# Patient Record
Sex: FEMALE | Race: BLACK OR AFRICAN AMERICAN | Employment: FULL TIME | URBAN - METROPOLITAN AREA
[De-identification: names, ages, dates, MRNs, and addresses within clinical notes are randomized per-mention and may not be internally consistent; named-entity substitution may affect disease eponyms.]

---

## 2018-05-10 ENCOUNTER — HOSPITAL ENCOUNTER (EMERGENCY)
Facility: HOSPITAL | Age: 58
Discharge: HOME | End: 2018-05-10
Payer: COMMERCIAL

## 2018-05-10 ENCOUNTER — APPOINTMENT (OUTPATIENT)
Dept: GENERAL RADIOLOGY | Facility: HOSPITAL | Age: 58
End: 2018-05-10
Attending: NURSE PRACTITIONER
Payer: COMMERCIAL

## 2018-05-10 VITALS
HEART RATE: 85 BPM | WEIGHT: 143 LBS | BODY MASS INDEX: 24.41 KG/M2 | SYSTOLIC BLOOD PRESSURE: 116 MMHG | OXYGEN SATURATION: 100 % | DIASTOLIC BLOOD PRESSURE: 66 MMHG | HEIGHT: 64 IN | TEMPERATURE: 98 F | RESPIRATION RATE: 18 BRPM

## 2018-05-10 DIAGNOSIS — S16.1XXA STRAIN OF NECK MUSCLE, INITIAL ENCOUNTER: Primary | ICD-10-CM

## 2018-05-10 PROCEDURE — 72040 X-RAY EXAM NECK SPINE 2-3 VW: CPT | Performed by: NURSE PRACTITIONER

## 2018-05-10 PROCEDURE — 99283 EMERGENCY DEPT VISIT LOW MDM: CPT

## 2018-05-10 RX ORDER — TRAMADOL HYDROCHLORIDE 50 MG/1
TABLET ORAL EVERY 4 HOURS PRN
Qty: 10 TABLET | Refills: 0 | Status: SHIPPED | OUTPATIENT
Start: 2018-05-10 | End: 2018-05-17

## 2018-05-10 NOTE — ED INITIAL ASSESSMENT (HPI)
Patient presents with complaints of neck pain; states she's been taking muscle relaxers with no relief.

## 2018-05-10 NOTE — ED PROVIDER NOTES
Patient Seen in: [facility] AND [facility] Emergency Department    History   Patient presents with:  Neck Pain (musculoskeletal, neurologic)    Stated Complaint: neck spasms     HPI    Patient presents into the emergency room for evaluation of neck pain.   Herbert systems reviewed and negative except as noted above.     Physical Exam   ED Triage Vitals [05/10/18 1637]  BP: 127/69  Pulse: 102  Resp: 17  Temp: 98.3 °F (36.8 °C)  Temp src: Oral  SpO2: 100 %  O2 Device: None (Room air)    Current:/66   Pulse 85   T to continue her Flexeril, and Ultram was added to her medication regimen.     Disposition and Plan     Clinical Impression:  Strain of neck muscle, initial encounter  (primary encounter diagnosis)    Disposition:  Discharge  5/10/2018  6:45 pm    Follow-up:

## 2019-02-18 ENCOUNTER — HOSPITAL ENCOUNTER (EMERGENCY)
Facility: HOSPITAL | Age: 59
Discharge: HOME | End: 2019-02-18
Payer: COMMERCIAL

## 2019-02-18 VITALS
WEIGHT: 150 LBS | BODY MASS INDEX: 25.61 KG/M2 | HEART RATE: 71 BPM | SYSTOLIC BLOOD PRESSURE: 119 MMHG | TEMPERATURE: 98 F | OXYGEN SATURATION: 98 % | HEIGHT: 64 IN | DIASTOLIC BLOOD PRESSURE: 70 MMHG | RESPIRATION RATE: 16 BRPM

## 2019-02-18 DIAGNOSIS — T14.8XXA SKIN AVULSION: ICD-10-CM

## 2019-02-18 DIAGNOSIS — S61.214A LACERATION OF RIGHT RING FINGER WITHOUT FOREIGN BODY WITHOUT DAMAGE TO NAIL, INITIAL ENCOUNTER: Primary | ICD-10-CM

## 2019-02-18 PROCEDURE — 99283 EMERGENCY DEPT VISIT LOW MDM: CPT

## 2019-02-18 PROCEDURE — 12002 RPR S/N/AX/GEN/TRNK2.6-7.5CM: CPT

## 2019-02-18 RX ORDER — HYDROXYCHLOROQUINE SULFATE 200 MG/1
200 TABLET, FILM COATED ORAL 2 TIMES DAILY
COMMUNITY
Start: 2018-05-02

## 2019-02-18 RX ORDER — PREDNISONE 1 MG/1
1 TABLET ORAL
COMMUNITY
Start: 2019-01-21

## 2019-02-18 NOTE — ED PROVIDER NOTES
Patient Seen in: [facility] AND [facility] Emergency Department    History   CC: finger lac  HPI: Kade Fisher 62year old female  who presents to the ER c/o right fourth and fifth finger laceration status post injury today in which she cut them both using a carlos laceration noted to the lateral aspect of the right fifth distal digit. Neither wounds involve the nailbed. No surrounding erythema or edema associated.   Skin is otherwise pink warm and dry throughout, mmm, cap refill <2seconds distal UE  Neuro - A&O x4, Discharge Medication List

## 2023-05-18 RX ORDER — LEVOTHYROXINE SODIUM 20 UG/ML
75 INJECTION, SOLUTION INTRAVENOUS DAILY
COMMUNITY
Start: 2023-05-18 | End: 2023-05-19 | Stop reason: ALTCHOICE

## 2023-05-18 RX ORDER — HYDROXYCHLOROQUINE SULFATE 200 MG/1
200 TABLET, FILM COATED ORAL DAILY
COMMUNITY
Start: 2023-05-18

## 2023-05-18 RX ORDER — TRIAMCINOLONE ACETONIDE 55 UG/1
SPRAY, METERED NASAL
COMMUNITY

## 2023-05-18 RX ORDER — SODIUM BICARBONATE 650 MG/1
TABLET ORAL
COMMUNITY
Start: 2023-05-18

## 2023-05-19 ENCOUNTER — OFFICE VISIT (OUTPATIENT)
Dept: INTERNAL MEDICINE CLINIC | Facility: CLINIC | Age: 63
End: 2023-05-19

## 2023-05-19 VITALS
OXYGEN SATURATION: 99 % | HEART RATE: 69 BPM | SYSTOLIC BLOOD PRESSURE: 104 MMHG | DIASTOLIC BLOOD PRESSURE: 62 MMHG | WEIGHT: 154.19 LBS | RESPIRATION RATE: 16 BRPM | BODY MASS INDEX: 27.32 KG/M2 | HEIGHT: 63 IN

## 2023-05-19 DIAGNOSIS — R73.03 PREDIABETES: ICD-10-CM

## 2023-05-19 DIAGNOSIS — Z01.419 ENCOUNTER FOR ROUTINE GYNECOLOGICAL EXAMINATION WITH PAPANICOLAOU SMEAR OF CERVIX: ICD-10-CM

## 2023-05-19 DIAGNOSIS — M06.9 RHEUMATOID ARTHRITIS, INVOLVING UNSPECIFIED SITE, UNSPECIFIED WHETHER RHEUMATOID FACTOR PRESENT (HCC): ICD-10-CM

## 2023-05-19 DIAGNOSIS — M32.9 LUPUS (HCC): Primary | ICD-10-CM

## 2023-05-19 DIAGNOSIS — E03.9 ACQUIRED HYPOTHYROIDISM: ICD-10-CM

## 2023-05-19 PROBLEM — Z80.3 FAMILY HISTORY OF BREAST CANCER IN MOTHER: Status: ACTIVE | Noted: 2023-05-19

## 2023-05-19 PROBLEM — IMO0002 LUPUS: Status: ACTIVE | Noted: 2023-05-19

## 2023-05-19 RX ORDER — LEVOTHYROXINE SODIUM 0.07 MG/1
75 TABLET ORAL EVERY MORNING
COMMUNITY
Start: 2023-02-21

## 2023-05-19 RX ORDER — MULTIVIT-MIN/IRON/FOLIC ACID/K 18-600-40
CAPSULE ORAL
COMMUNITY

## 2023-06-14 RX ORDER — LEVOTHYROXINE SODIUM 0.07 MG/1
75 TABLET ORAL EVERY MORNING
Qty: 90 TABLET | Refills: 3 | Status: SHIPPED | OUTPATIENT
Start: 2023-06-14

## 2023-06-14 NOTE — TELEPHONE ENCOUNTER
Rx listed as historical, pls advise, thanks in advance. Please review refill protocol failed/ no protocol  Requested Prescriptions   Pending Prescriptions Disp Refills    levothyroxine 75 MCG Oral Tab  0     Sig: Take 1 tablet (75 mcg total) by mouth every morning.        Thyroid Medication Protocol Failed - 6/14/2023 10:04 AM        Failed - TSH in past 12 months        Failed - Last TSH value is normal     No results found for: TSH, THYROIDFUNC, TSHT4              Passed - In person appointment or virtual visit in the past 12 mos or appointment in next 3 mos     Recent Outpatient Visits              3 weeks ago Lupus [facility])    Naomy Ferreira MD    Office Visit          Future Appointments         Provider Department Appt Notes    In 1 month Odell Nelson MD [facility] [address] [facility] [facility]

## 2023-06-16 ENCOUNTER — LAB ENCOUNTER (OUTPATIENT)
Dept: LAB | Facility: HOSPITAL | Age: 63
End: 2023-06-16
Attending: INTERNAL MEDICINE
Payer: COMMERCIAL

## 2023-06-16 LAB
ALBUMIN SERPL-MCNC: 4 G/DL (ref 3.4–5)
ALBUMIN/GLOB SERPL: 0.9 {RATIO} (ref 1–2)
ALP LIVER SERPL-CCNC: 62 U/L
ALT SERPL-CCNC: 23 U/L
ANION GAP SERPL CALC-SCNC: 8 MMOL/L (ref 0–18)
AST SERPL-CCNC: 20 U/L (ref 15–37)
BILIRUB SERPL-MCNC: 0.3 MG/DL (ref 0.1–2)
BUN BLD-MCNC: 9 MG/DL (ref 7–18)
BUN/CREAT SERPL: 12.2 (ref 10–20)
CALCIUM BLD-MCNC: 9.4 MG/DL (ref 8.5–10.1)
CHLORIDE SERPL-SCNC: 105 MMOL/L (ref 98–112)
CHOLEST SERPL-MCNC: 135 MG/DL (ref ?–200)
CO2 SERPL-SCNC: 28 MMOL/L (ref 21–32)
CREAT BLD-MCNC: 0.74 MG/DL
DEPRECATED RDW RBC AUTO: 43 FL (ref 35.1–46.3)
ERYTHROCYTE [DISTWIDTH] IN BLOOD BY AUTOMATED COUNT: 13.3 % (ref 11–15)
EST. AVERAGE GLUCOSE BLD GHB EST-MCNC: 120 MG/DL (ref 68–126)
FASTING PATIENT LIPID ANSWER: YES
FASTING STATUS PATIENT QL REPORTED: YES
GFR SERPLBLD BASED ON 1.73 SQ M-ARVRAT: 91 ML/MIN/1.73M2 (ref 60–?)
GLOBULIN PLAS-MCNC: 4.7 G/DL (ref 2.8–4.4)
GLUCOSE BLD-MCNC: 96 MG/DL (ref 70–99)
HBA1C MFR BLD: 5.8 % (ref ?–5.7)
HCT VFR BLD AUTO: 42.8 %
HDLC SERPL-MCNC: 63 MG/DL (ref 40–59)
HGB BLD-MCNC: 13.4 G/DL
LDLC SERPL CALC-MCNC: 62 MG/DL (ref ?–100)
MCH RBC QN AUTO: 27.4 PG (ref 26–34)
MCHC RBC AUTO-ENTMCNC: 31.3 G/DL (ref 31–37)
MCV RBC AUTO: 87.5 FL
NONHDLC SERPL-MCNC: 72 MG/DL (ref ?–130)
OSMOLALITY SERPL CALC.SUM OF ELEC: 291 MOSM/KG (ref 275–295)
PLATELET # BLD AUTO: 242 10(3)UL (ref 150–450)
POTASSIUM SERPL-SCNC: 4.1 MMOL/L (ref 3.5–5.1)
PROT SERPL-MCNC: 8.7 G/DL (ref 6.4–8.2)
RBC # BLD AUTO: 4.89 X10(6)UL
SODIUM SERPL-SCNC: 141 MMOL/L (ref 136–145)
TRIGL SERPL-MCNC: 44 MG/DL (ref 30–149)
TSI SER-ACNC: 1.55 MIU/ML (ref 0.36–3.74)
VLDLC SERPL CALC-MCNC: 7 MG/DL (ref 0–30)
WBC # BLD AUTO: 3.2 X10(3) UL (ref 4–11)

## 2023-06-16 PROCEDURE — 84443 ASSAY THYROID STIM HORMONE: CPT | Performed by: INTERNAL MEDICINE

## 2023-06-16 PROCEDURE — 85027 COMPLETE CBC AUTOMATED: CPT | Performed by: INTERNAL MEDICINE

## 2023-06-16 PROCEDURE — 80061 LIPID PANEL: CPT | Performed by: INTERNAL MEDICINE

## 2023-06-16 PROCEDURE — 80053 COMPREHEN METABOLIC PANEL: CPT | Performed by: INTERNAL MEDICINE

## 2023-06-16 PROCEDURE — 83036 HEMOGLOBIN GLYCOSYLATED A1C: CPT | Performed by: INTERNAL MEDICINE

## 2023-06-16 PROCEDURE — 36415 COLL VENOUS BLD VENIPUNCTURE: CPT | Performed by: INTERNAL MEDICINE

## 2023-07-03 DIAGNOSIS — Z12.12 ENCOUNTER FOR COLORECTAL CANCER SCREENING: Primary | ICD-10-CM

## 2023-07-03 DIAGNOSIS — Z12.11 ENCOUNTER FOR COLORECTAL CANCER SCREENING: Primary | ICD-10-CM

## 2023-07-18 ENCOUNTER — OFFICE VISIT (OUTPATIENT)
Dept: RHEUMATOLOGY | Facility: CLINIC | Age: 63
End: 2023-07-18

## 2023-07-18 VITALS
SYSTOLIC BLOOD PRESSURE: 154 MMHG | HEIGHT: 63 IN | RESPIRATION RATE: 16 BRPM | HEART RATE: 73 BPM | WEIGHT: 153.81 LBS | BODY MASS INDEX: 27.25 KG/M2 | DIASTOLIC BLOOD PRESSURE: 84 MMHG

## 2023-07-18 DIAGNOSIS — E55.9 VITAMIN D DEFICIENCY: ICD-10-CM

## 2023-07-18 DIAGNOSIS — M79.10 MYALGIA: ICD-10-CM

## 2023-07-18 DIAGNOSIS — M06.9 RHEUMATOID ARTHRITIS INVOLVING MULTIPLE SITES, UNSPECIFIED WHETHER RHEUMATOID FACTOR PRESENT (HCC): ICD-10-CM

## 2023-07-18 DIAGNOSIS — M32.9 SYSTEMIC LUPUS ERYTHEMATOSUS, UNSPECIFIED SLE TYPE, UNSPECIFIED ORGAN INVOLVEMENT STATUS (HCC): Primary | ICD-10-CM

## 2023-07-18 PROCEDURE — 3077F SYST BP >= 140 MM HG: CPT | Performed by: INTERNAL MEDICINE

## 2023-07-18 PROCEDURE — 3079F DIAST BP 80-89 MM HG: CPT | Performed by: INTERNAL MEDICINE

## 2023-07-18 PROCEDURE — 3008F BODY MASS INDEX DOCD: CPT | Performed by: INTERNAL MEDICINE

## 2023-07-18 PROCEDURE — 99204 OFFICE O/P NEW MOD 45 MIN: CPT | Performed by: INTERNAL MEDICINE

## 2023-07-18 RX ORDER — HYDROXYCHLOROQUINE SULFATE 200 MG/1
200 TABLET, FILM COATED ORAL DAILY
Qty: 90 TABLET | Refills: 1 | Status: SHIPPED | OUTPATIENT
Start: 2023-07-18

## 2023-07-18 NOTE — PATIENT INSTRUCTIONS
Check labs in 6months   Cont. Hydroxychlroquine 200mg ad ay   Opthalmoloigst -   Dr. Rogers - [phone] -     Dr. Eran Ho  [address]  [address] [place] [address] [phone]    4. Return to clinic in 6 months.

## 2023-10-11 ENCOUNTER — OFFICE VISIT (OUTPATIENT)
Dept: OPHTHALMOLOGY | Facility: CLINIC | Age: 63
End: 2023-10-11

## 2023-10-11 DIAGNOSIS — Z79.899 LONG-TERM USE OF PLAQUENIL: Primary | ICD-10-CM

## 2023-10-11 DIAGNOSIS — H25.13 AGE-RELATED NUCLEAR CATARACT OF BOTH EYES: ICD-10-CM

## 2023-10-11 PROCEDURE — 92004 COMPRE OPH EXAM NEW PT 1/>: CPT | Performed by: OPHTHALMOLOGY

## 2023-10-11 PROCEDURE — 92015 DETERMINE REFRACTIVE STATE: CPT | Performed by: OPHTHALMOLOGY

## 2023-10-11 NOTE — PROGRESS NOTES
Clyde Rodarte is a 58year old female. HPI:     HPI    NP. Pt in today for a Plaquenil eye exam. Pt has been taking Plaquenil for Lupus for about 25 years and follows up with Dr. Giana Moreno every 6 months. Pt's last eye exam was about a year ago with Dr. Susana Drew who she had been seeing for 25 years but insurance no longer covers her. Pt denies any surgeries done to the eyes but states that her vision has changed drastically over the past year. Pt states she is not able to see out of her most recent glasses but found out she sees better with an older pair of glasses she had. Consult: per Dr. Gage Irizarry   Last edited by Dariana Gudino OT on 10/11/2023  3:33 PM.        Patient History:  Past Medical History:   Diagnosis Date    Allergic rhinitis 1969    Anemia     Arthritis 1998    Hypothyroidism     Lupus ([facility] [address]     Thyroid disease        Surgical History: Clyde Rodarte has a past surgical history that includes colonoscopy ();  (); and other surgical history (Left) (breast biospy). Family History   Problem Relation Age of Onset    Cancer Mother         breast    Cancer Father         liver    Glaucoma Neg     Macular degeneration Neg     Diabetes Neg        Social History:   Social History     Socioeconomic History    Marital status:    Tobacco Use    Smoking status: Never    Smokeless tobacco: Never   Vaping Use    Vaping Use: Never used   Substance and Sexual Activity    Alcohol use: Yes     Alcohol/week: 1.0 standard drink of alcohol     Types: 1 Glasses of wine per week     Comment: occ    Drug use: Never       Medications:  Current Outpatient Medications   Medication Sig Dispense Refill    hydroxychloroquine 200 MG Oral Tab Take 1 tablet (200 mg total) by mouth daily. 90 tablet 1    levothyroxine 75 MCG Oral Tab Take 1 tablet (75 mcg total) by mouth every morning.  90 tablet 3    Cholecalciferol (VITAMIN D3) 25 MCG (1000 UT) Oral Cap       Cyanocobalamin (VITAMIN B 12) 500 MCG Oral Tab          Allergies:    Bupropion               ITCHING, ANGIOEDEMA, UNKNOWN,                            NAUSEA AND VOMITING  Pineapple               OTHER (SEE COMMENTS)    Comment:Itchy tongue    ROS:     ROS    Positive for: Eyes  Negative for: Constitutional, Gastrointestinal, Neurological, Skin, Genitourinary, Musculoskeletal, HENT, Endocrine, Cardiovascular, Respiratory, Psychiatric, Allergic/Imm, Heme/Lymph  Last edited by Blaine Luke OT on 10/11/2023  2:58 PM.          PHYSICAL EXAM:     Base Eye Exam       Visual Acuity (Snellen - Linear)         Right Left    Dist cc 20/60 -2 20/25    Dist ph cc 20/30 +2     Near cc 20/30+ 20/25-      Correction: Glasses              Visual Acuity #2 (Snellen - Linear)         Right Left    Dist cc 20/200 20/60 +2    Dist ph cc 20/50 -2 20/30 -2      Correction: Glasses              Visual Acuity Comments    DVA 1 checked with old glasses  DVA 2 checked with new glasses             Tonometry (Icare, 3:28 PM)         Right Left    Pressure 13 11              Pupils         Pupils    Right PERRL    Left PERRL              Visual Fields         Left Right     Full Full              Extraocular Movement         Right Left     Full, Ortho Full, Ortho              Dilation       Both eyes: 1.0% Mydriacyl and 2.5% Hugo Synephrine @ 3:28 PM              Dilation #2       Both eyes: 1.0% Mydriacyl and 2.5% Hugo Synephrine @ 3:31 PM                  Additional Tests       Amsler         Right Left     Normal Normal              Color         Right Left    Ishihara 5/5 5/5                  Slit Lamp and Fundus Exam       Slit Lamp Exam         Right Left    Lids/Lashes Dermatochalasis, Meibomian gland dysfunction Dermatochalasis, Meibomian gland dysfunction    Conjunctiva/Sclera Normal Normal    Cornea Clear Clear    Anterior Chamber Deep and quiet Deep and quiet    Iris Normal Normal    Lens 1-2+ Nuclear sclerosis 1-2+ Nuclear sclerosis    Vitreous Clear Clear Fundus Exam         Right Left    Disc Good rim Good rim    C/D Ratio 0.5 0.5    Macula Normal-no plaquenil toxicity Normal-no plaquenil toxicity    Vessels Normal Normal    Periphery Normal Normal                  Refraction       Wearing Rx         Sphere Cylinder Axis Add    Right +0.50 +0.50 075 +2.00    Left +0.50 +0.50 085 +2.00      Age: 4yrs    Type: Old Progressive bifocal              Wearing Rx #2         Sphere Cylinder Axis Add    Right +1.00 +0.75 070 +2.00    Left +1.50 +0.50 090 +2.00      Age: 2yrs    Type: New Progressive bifocal              Manifest Refraction (Auto)         Sphere Cylinder [place] Add [place]    Right -0.75 +0.75 075       Left +1.00 Sphere                  Manifest Refraction #2         Sphere Cylinder [place] Dist [place] Add [place]    Right -1.00 +0.75 075 20/20- +2.25 20/20    Left +0.75 Sphere  20/20- +2.25 20/20              Final Rx         Sphere Cylinder [place] Dist VA Add [place]    Right -1.00 +0.75 075 20/20- +2.25 20/20    Left +0.75 Sphere  20/20- +2.25 20/20      Type: Progressive bifocal                     ASSESSMENT/PLAN:     Diagnoses and Plan:     Long-term use of Plaquenil   No evidence of plaquenil toxicity in either eye. Will follow in 1 year for a plaquenil eye exam based on current guidelines. Patient is approved to continue on plaquenil as directed by their PCP or rheumatologist.      Age-related nuclear cataract of both eyes  Discussed mild cataracts in both eyes that are not affecting vision and are not surgical at this time. New glasses improve vision; suggest update. No orders of the defined types were placed in this encounter.       Meds This Visit:  Requested Prescriptions      No prescriptions requested or ordered in this encounter        Follow up instructions:  Return in about 1 year (around 10/11/2024) for Plaquenil eye exam.    10/11/2023  Scribed by: Amador Sosa MD

## 2023-10-11 NOTE — ASSESSMENT & PLAN NOTE
Discussed mild cataracts in both eyes that are not affecting vision and are not surgical at this time. New glasses improve vision; suggest update.

## 2023-10-11 NOTE — PATIENT INSTRUCTIONS
Long-term use of Plaquenil   No evidence of plaquenil toxicity in either eye. Will follow in 1 year for a plaquenil eye exam based on current guidelines. Patient is approved to continue on plaquenil as directed by their PCP or rheumatologist.      Age-related nuclear cataract of both eyes  Discussed mild cataracts in both eyes that are not affecting vision and are not surgical at this time. New glasses improve vision; suggest update.

## 2023-11-14 ENCOUNTER — TELEPHONE (OUTPATIENT)
Dept: INTERNAL MEDICINE CLINIC | Facility: CLINIC | Age: 63
End: 2023-11-14

## 2023-11-14 DIAGNOSIS — Z12.31 SCREENING MAMMOGRAM FOR BREAST CANCER: Primary | ICD-10-CM

## 2023-11-14 NOTE — TELEPHONE ENCOUNTER
Please notify patient she is also due for an annual physical and has other care gaps that should be addressed at that time. Can schedule with me if Dr. Barrie Eden not available. She was requested to obtain her colonoscopy results which she can bring to the appointment or have faxed over.

## 2023-11-29 ENCOUNTER — HOSPITAL ENCOUNTER (OUTPATIENT)
Dept: MAMMOGRAPHY | Age: 63
Discharge: HOME | End: 2023-11-29
Payer: COMMERCIAL

## 2023-11-29 ENCOUNTER — LAB ENCOUNTER (OUTPATIENT)
Dept: LAB | Age: 63
End: 2023-11-29
Payer: COMMERCIAL

## 2023-11-29 DIAGNOSIS — Z12.31 SCREENING MAMMOGRAM FOR BREAST CANCER: ICD-10-CM

## 2023-11-29 DIAGNOSIS — E55.9 VITAMIN D DEFICIENCY: ICD-10-CM

## 2023-11-29 DIAGNOSIS — M06.9 RHEUMATOID ARTHRITIS INVOLVING MULTIPLE SITES, UNSPECIFIED WHETHER RHEUMATOID FACTOR PRESENT (HCC): ICD-10-CM

## 2023-11-29 DIAGNOSIS — M79.10 MYALGIA: ICD-10-CM

## 2023-11-29 DIAGNOSIS — M32.9 SYSTEMIC LUPUS ERYTHEMATOSUS, UNSPECIFIED SLE TYPE, UNSPECIFIED ORGAN INVOLVEMENT STATUS (HCC): ICD-10-CM

## 2023-11-29 LAB
ALBUMIN SERPL-MCNC: 4.6 G/DL (ref 3.2–4.8)
ALBUMIN/GLOB SERPL: 1.1 {RATIO} (ref 1–2)
ALP LIVER SERPL-CCNC: 72 U/L
ALT SERPL-CCNC: 19 U/L
ANION GAP SERPL CALC-SCNC: 7 MMOL/L (ref 0–18)
AST SERPL-CCNC: 25 U/L (ref ?–34)
BILIRUB SERPL-MCNC: 0.4 MG/DL (ref 0.2–1.1)
BUN BLD-MCNC: 10 MG/DL (ref 9–23)
BUN/CREAT SERPL: 11.9 (ref 10–20)
C3 SERPL-MCNC: 136.8 MG/DL (ref 90–170)
C4 SERPL-MCNC: 21.1 MG/DL (ref 12–36)
CALCIUM BLD-MCNC: 9.8 MG/DL (ref 8.7–10.4)
CHLORIDE SERPL-SCNC: 104 MMOL/L (ref 98–112)
CK SERPL-CCNC: 110 U/L
CO2 SERPL-SCNC: 28 MMOL/L (ref 21–32)
CREAT BLD-MCNC: 0.84 MG/DL
CRP SERPL-MCNC: <0.4 MG/DL (ref ?–1)
DEPRECATED RDW RBC AUTO: 41.1 FL (ref 35.1–46.3)
EGFRCR SERPLBLD CKD-EPI 2021: 79 ML/MIN/1.73M2 (ref 60–?)
ERYTHROCYTE [DISTWIDTH] IN BLOOD BY AUTOMATED COUNT: 13.1 % (ref 11–15)
ERYTHROCYTE [SEDIMENTATION RATE] IN BLOOD: 21 MM/HR
FASTING STATUS PATIENT QL REPORTED: YES
GLOBULIN PLAS-MCNC: 4.1 G/DL (ref 2.8–4.4)
GLUCOSE BLD-MCNC: 97 MG/DL (ref 70–99)
HCT VFR BLD AUTO: 43.7 %
HGB BLD-MCNC: 13.9 G/DL
MCH RBC QN AUTO: 27.6 PG (ref 26–34)
MCHC RBC AUTO-ENTMCNC: 31.8 G/DL (ref 31–37)
MCV RBC AUTO: 86.9 FL
OSMOLALITY SERPL CALC.SUM OF ELEC: 287 MOSM/KG (ref 275–295)
PLATELET # BLD AUTO: 249 10(3)UL (ref 150–450)
POTASSIUM SERPL-SCNC: 4 MMOL/L (ref 3.5–5.1)
PROT SERPL-MCNC: 8.7 G/DL (ref 5.7–8.2)
RBC # BLD AUTO: 5.03 X10(6)UL
RHEUMATOID FACT SERPL-ACNC: 18 IU/ML (ref ?–14)
SODIUM SERPL-SCNC: 139 MMOL/L (ref 136–145)
VIT D+METAB SERPL-MCNC: 39.1 NG/ML (ref 30–100)
WBC # BLD AUTO: 3.7 X10(3) UL (ref 4–11)

## 2023-11-29 PROCEDURE — 86140 C-REACTIVE PROTEIN: CPT

## 2023-11-29 PROCEDURE — 86431 RHEUMATOID FACTOR QUANT: CPT

## 2023-11-29 PROCEDURE — 82550 ASSAY OF CK (CPK): CPT

## 2023-11-29 PROCEDURE — 77063 BREAST TOMOSYNTHESIS BI: CPT

## 2023-11-29 PROCEDURE — 86225 DNA ANTIBODY NATIVE: CPT

## 2023-11-29 PROCEDURE — 85027 COMPLETE CBC AUTOMATED: CPT

## 2023-11-29 PROCEDURE — 86038 ANTINUCLEAR ANTIBODIES: CPT

## 2023-11-29 PROCEDURE — 85652 RBC SED RATE AUTOMATED: CPT

## 2023-11-29 PROCEDURE — 86160 COMPLEMENT ANTIGEN: CPT

## 2023-11-29 PROCEDURE — 86200 CCP ANTIBODY: CPT

## 2023-11-29 PROCEDURE — 36415 COLL VENOUS BLD VENIPUNCTURE: CPT

## 2023-11-29 PROCEDURE — 86235 NUCLEAR ANTIGEN ANTIBODY: CPT

## 2023-11-29 PROCEDURE — 82306 VITAMIN D 25 HYDROXY: CPT

## 2023-11-29 PROCEDURE — 77067 SCR MAMMO BI INCL CAD: CPT

## 2023-11-29 PROCEDURE — 82085 ASSAY OF ALDOLASE: CPT

## 2023-11-29 PROCEDURE — 86039 ANTINUCLEAR ANTIBODIES (ANA): CPT

## 2023-11-29 PROCEDURE — 80053 COMPREHEN METABOLIC PANEL: CPT

## 2023-11-30 LAB
ALDOLASE: 3.6 U/L
CCP IGG SERPL-ACNC: 1.7 U/ML (ref 0–6.9)
NUCLEAR IGG TITR SER IF: POSITIVE {TITER}

## 2023-12-01 LAB — ANA NUCLEOLAR TITR SER IF: 1280 {TITER}

## 2023-12-02 LAB
DSDNA IGG SERPL IA-ACNC: 1.4 IU/ML
ENA AB SER QL IA: 26 UG/L
ENA AB SER QL IA: POSITIVE

## 2023-12-04 LAB
CENTROMERE IGG SER-ACNC: 1.1 U/ML
ENA JO1 AB SER IA-ACNC: 0.4 U/ML
ENA RNP IGG SER IA-ACNC: 135 U/ML
ENA SCL70 IGG SER IA-ACNC: 0.8 U/ML
ENA SM IGG SER IA-ACNC: 1.2 U/ML
ENA SS-A IGG SER IA-ACNC: 1.7 U/ML
ENA SS-B IGG SER IA-ACNC: 0.7 U/ML
U1 SNRNP IGG SER IA-ACNC: 114 U/ML

## 2023-12-11 ENCOUNTER — IMMUNIZATION (OUTPATIENT)
Dept: LAB | Age: 63
End: 2023-12-11
Attending: EMERGENCY MEDICINE
Payer: COMMERCIAL

## 2023-12-11 DIAGNOSIS — Z23 NEED FOR VACCINATION: Primary | ICD-10-CM

## 2023-12-11 PROCEDURE — 90480 ADMN SARSCOV2 VAC 1/ONLY CMP: CPT

## 2023-12-11 PROCEDURE — 90686 IIV4 VACC NO PRSV 0.5 ML IM: CPT

## 2023-12-11 PROCEDURE — 90471 IMMUNIZATION ADMIN: CPT

## 2024-01-16 ENCOUNTER — TELEPHONE (OUTPATIENT)
Dept: CASE MANAGEMENT | Age: 64
End: 2024-01-16

## 2024-01-16 DIAGNOSIS — M32.9 LUPUS (HCC): Primary | ICD-10-CM

## 2024-01-16 NOTE — TELEPHONE ENCOUNTER
Dr Ervni,     Patient called requesting referral to Dr. Rizzo.     Pended referral please review diagnosis and sign off if you agree.    Thank you.  Felisha Mccann  [facility]

## 2024-03-07 ENCOUNTER — OFFICE VISIT (OUTPATIENT)
Dept: INTERNAL MEDICINE CLINIC | Facility: CLINIC | Age: 64
End: 2024-03-07
Payer: COMMERCIAL

## 2024-03-07 VITALS
HEART RATE: 65 BPM | RESPIRATION RATE: 18 BRPM | WEIGHT: 164.19 LBS | BODY MASS INDEX: 29.09 KG/M2 | DIASTOLIC BLOOD PRESSURE: 69 MMHG | SYSTOLIC BLOOD PRESSURE: 112 MMHG | HEIGHT: 63 IN

## 2024-03-07 DIAGNOSIS — Z00.00 PHYSICAL EXAM, ANNUAL: Primary | ICD-10-CM

## 2024-03-07 DIAGNOSIS — Z12.4 SCREENING FOR CERVICAL CANCER: ICD-10-CM

## 2024-03-07 PROCEDURE — 99396 PREV VISIT EST AGE 40-64: CPT | Performed by: INTERNAL MEDICINE

## 2024-03-07 NOTE — PROGRESS NOTES
Lora Lopez is a 63 year old female.  Chief Complaint   Patient presents with    Physical     Annual        HPI:   Pt comes for her annual physical  C/c annual physical  C/o   WILL GET ORAL SURG TOMORROW      HISTORY  [facility] patient- [facility]   C/C establish care  C/o overall doing well   Chart reviewed      [facility]  Lupus -  Hypothyroidism               Retired -used to work at [employer] now volunteering and helping care of granddaughter   Lives with        Current Outpatient Medications   Medication Sig Dispense Refill    hydroxychloroquine 200 MG Oral Tab Take 1 tablet (200 mg total) by mouth daily. 90 tablet 1    levothyroxine 75 MCG Oral Tab Take 1 tablet (75 mcg total) by mouth every morning. 90 tablet 3    Cholecalciferol (VITAMIN D3) 25 MCG (1000 UT) Oral Cap       Cyanocobalamin (VITAMIN B 12) 500 MCG Oral Tab         Past Medical History:   Diagnosis Date    Allergic rhinitis 1969    Anemia     Arthritis 1998    Hypothyroidism     Lupus (HCC)     Thyroid disease       Past Surgical History:   Procedure Laterality Date    Colonoscopy      Needle biopsy left      x's 3          Other surgical history Left     breast biospy      Social History:  Social History     Socioeconomic History    Marital status:    Tobacco Use    Smoking status: Never    Smokeless tobacco: Never   Vaping Use    Vaping Use: Never used   Substance and Sexual Activity    Alcohol use: Yes     Alcohol/week: 1.0 standard drink of alcohol     Types: 1 Glasses of wine per week     Comment: occ    Drug use: Never        REVIEW OF SYSTEMS:   GENERAL HEALTH: No fevers, chills, sweats, fatigue  VISION: No recent vision problems, blurry vision or double vision  HEENT: + decreased hearing , no ear pain nasal congestion or sore throat  SKIN: denies any unusual skin lesions or rashes  RESPIRATORY: denies shortness of breath, cough, wheezing  CARDIOVASCULAR: denies chest pain on exertion, palpitations,  swelling in feet  GI: denies abdominal pain and denies heartburn, nausea or vomiting  : No Pain on urination, change in the color of urine, discharge, urinating frequently  MUS: No back pain, joint pain, muscle pain  NEURO: denies headaches , anxiety, depression    EXAM:   /69 (BP Location: Left arm, Patient Position: Sitting, Cuff Size: adult)   Pulse 65   Resp 18   Ht 5' 3\" (1.6 m)   Wt 164 lb 3.2 oz (74.5 kg)   BMI 29.09 kg/m²   GENERAL: well developed, well nourished,in no apparent distress  SKIN: no rashes,no suspicious lesions  HEENT: atraumatic, normocephalic,ears and throat are clear, no frontal or maxillary sinus tenderness, pupils equal reactive to light bilaterally, extraocular muscles intact  NECK: supple,no adenopathy,nontender   LUNGS: clear to auscultation, no wheeze  CARDIO: RRR without murmur  GI: good BS's,no masses or tenderness  EXTREMITIES: no cyanosis, or edema    ASSESSMENT AND PLAN:   Diagnoses and all orders for this visit:    Physical exam, annual  -     Lipid Panel  -     Assay, Thyroid Stim Hormone    Screening for cervical cancer  -     OBG Referral - In Network    Advised patient to watch what she eats and exercise, seatbelt use no texting driving, sunscreen use advised       Preventative medicine  Labs from November 2023 6/2023 reviewed  Mammogram November 2023  Pap smear 2019   Colonoscopy will bring results     The patient indicates understanding of these issues and agrees to the plan.  No follow-ups on file.

## 2024-03-18 ENCOUNTER — OFFICE VISIT (OUTPATIENT)
Dept: OBGYN CLINIC | Facility: CLINIC | Age: 64
End: 2024-03-18
Payer: COMMERCIAL

## 2024-03-18 VITALS
HEIGHT: 63 IN | DIASTOLIC BLOOD PRESSURE: 72 MMHG | WEIGHT: 162.19 LBS | BODY MASS INDEX: 28.74 KG/M2 | SYSTOLIC BLOOD PRESSURE: 118 MMHG

## 2024-03-18 DIAGNOSIS — Z12.4 SCREENING FOR CERVICAL CANCER: ICD-10-CM

## 2024-03-18 DIAGNOSIS — Z01.419 ENCOUNTER FOR ANNUAL ROUTINE GYNECOLOGICAL EXAMINATION: Primary | ICD-10-CM

## 2024-03-18 PROCEDURE — 87624 HPV HI-RISK TYP POOLED RSLT: CPT | Performed by: OBSTETRICS & GYNECOLOGY

## 2024-03-18 NOTE — PROGRESS NOTES
ANNUAL GYN EXAM  [facility]    CHIEF COMPLAINT:    Chief Complaint   Patient presents with    Annual      HISTORY OF PRESENT ILLNESS:   Lora Lopez is a 63 year old female   who presents for annual well woman visit.  She is feeling well without complaints.    Menopause age 55. Previously used Premarin vaginal cream. Not currenly sexually active with her  who is older    History UFE in late 40s .    PAST GYNECOLOGICAL HISTORY & OTHER PREVENTIVE MEDICINE  LMP: No LMP recorded. Patient is postmenopausal.  Period Cycle (Days): postmeno since age 55 per pt (3/18/2024  2:43 PM)  Use of Birth Control (if yes, specify type): Postmenopausal (3/18/2024  2:43 PM)  Hx Prior Abnormal Pap: No (3/18/2024  2:43 PM)  Pap Date: 19 (3/18/2024  2:43 PM)  Pap Result Notes: neg (3/18/2024  2:43 PM)  Follow Up Recommendation: last mammo done 2023 wnl (3/18/2024  2:43 PM)    Complications: denies PMPB  Gravita/Parity:   Contraception: current -menopause; Previous -   Sexually transmitted disease history:None  Number of sexual partners: current sexual partners: 1, yrs, Lifetime partners:   Pap history: 2019Last pap/result: normal ; history abnormals: denies  Date of last mammogram: UTD 2023; history abnormals denies  Last Colonoscopy: has order ; history abnormals   Abuse history: denies  Vaginal discharge: denies  Bladder symptoms: denies    PAST MEDICAL HISTORY:   Past Medical History:   Diagnosis Date    Allergic rhinitis 1969    Anemia     Arthritis 1998    Hypothyroidism     Lupus (HCC)     Thyroid disease         PAST SURGICAL HISTORY:   Past Surgical History:   Procedure Laterality Date    Colonoscopy      Needle biopsy left      x's 3          Other surgical history Left     breast biospy        PAST OB HISTORY:  OB History    Para Term  AB Living   3 3 3     3   SAB IAB Ectopic Multiple Live Births           3      # Outcome Date GA Lbr Sukhwinder/2nd Weight Sex  Delivery Anes PTL Lv   3 Term      NORMAL SPONT      2 Term      NORMAL SPONT      1 Term      NORMAL SPONT          CURRENT MEDICATIONS:      Current Outpatient Medications:     hydroxychloroquine 200 MG Oral Tab, Take 1 tablet (200 mg total) by mouth daily., Disp: 90 tablet, Rfl: 1    levothyroxine 75 MCG Oral Tab, Take 1 tablet (75 mcg total) by mouth every morning., Disp: 90 tablet, Rfl: 3    Cholecalciferol (VITAMIN D3) 25 MCG (1000 UT) Oral Cap, , Disp: , Rfl:     Cyanocobalamin (VITAMIN B 12) 500 MCG Oral Tab, , Disp: , Rfl:     ALLERGIES:  Allergies   Allergen Reactions    Bupropion ITCHING, ANGIOEDEMA, UNKNOWN and NAUSEA AND VOMITING    Pineapple OTHER (SEE COMMENTS)     Itchy tongue        SOCIAL HISTORY:  Social History     Socioeconomic History    Marital status:    Tobacco Use    Smoking status: Never    Smokeless tobacco: Never   Vaping Use    Vaping Use: Never used   Substance and Sexual Activity    Alcohol use: Not Currently     Alcohol/week: 1.0 standard drink of alcohol     Types: 1 Glasses of wine per week     Comment: occ    Drug use: Never    Sexual activity: Not Currently   Other Topics Concern    Blood Transfusions No       FAMILY HISTORY:  Family History   Problem Relation Age of Onset    Breast Cancer Mother 62    Cancer Mother         breast    Cancer Father         liver    Glaucoma Neg     Macular degeneration Neg     Diabetes Neg      ASSESSMENTS:  REVIEW OF SYSTEMS:  CONSTITUTIONAL:  negative for fevers, chills and sweats    EYES:  negative for  blurred vision and visual disturbance  RESPIRATORY:  negative for  cough and shortness of breath  CARDIOVASCULAR:  negative for  chest pain, palpitations  GASTROINTESTINAL:  No constipation/diarrhea, no pain  GENITOURINARY:  See History of Present Illness  INTEGUMENT/BREAST: Breast: no masses, no nipple discharge  ENDOCRINE:  negative for acne, constipation, diarrhea, cold intolerance, heat intolerance, fatigue, hair loss, weight gain and  weight loss  MUSCULOSKELETAL:  negative for joint pain  NEUROLOGICAL:  negative for dizziness/lightheadedness and headaches  BEHAVIOR/PSYCH:  Negative for depressed mood, anhedonia and anxiety    PHYSICAL EXAM  No LMP recorded. Patient is postmenopausal.   Vitals:    03/18/24 1441   BP: 118/72   Weight: 162 lb 3.2 oz (73.6 kg)   Height: 63\"       CONSTITUTIONAL: Awake, alert, cooperative, no apparent distress, and appears stated age   NECK: Supple, symmetrical, trachea midline, no adenopathy, thyroid symmetric, not enlarged and no tenderness  LUNGS: Clear to auscultation bilaterally, no crackles or wheezing  CARDIOVASCULAR: Regular rate and rhythm, normal S1 and S2, no murmur noted  ABDOMEN: Soft, non-distended, non-tender, no masses palpated    CHEST/BREASTS: Breasts symmetrical, skin without lesion(s), no nipple retraction or dimpling, no nipple discharge, no masses palpated, no axillary or supraclavicular adenopathy  GENITAL/URINARY:    External Genitalia:  General appearance; normal, Hair distribution; normal, Lesions absent   Urethral Meatus:  Lesions absent, Prolapse absent  Bladder:  Tenderness absent, Cystocele absent  Vagina:  Discharge absent, Lesions absent, Pelvic support normal  Cervix:  Lesions absent, Discharge absent, Tenderness absent  Uterus:  Size normal, Masses absent, Tenderness absent  Adnexa:  Masses absent, Tenderness absent  Anus/Perineum:  Lesions absent    MUSCULOSKELETAL: There is no redness, warmth, or swelling of the joints.  Full range of motion noted.  Motor strength is 5 out of 5 all extremities bilaterally.  Tone is normal.  NEUROLOGIC: Patient is awake, alert and oriented to name, place and time.  Casual gait is normal.  SKIN: no bruising or bleeding and no rashes  PSYCHIATRIC: Behavior:  Appropriate  Mood:  appropriate  ASSESSMENT AND PLAN:  1. Encounter for annual routine gynecological examination  Mammogram up to date; patient to schedule colon screen    2. Screening for  cervical cancer    - ThinPrep PAP Smear; Future  - Hpv Dna  High Risk , Thin Prep Collect; Future  - ThinPrep PAP Smear  - Hpv Dna  High Risk , Thin Prep Collect       Preventive Medicine in a 63 year old female  Health Maintenance Topics with due status: Overdue       Topic Date Due    Colorectal Cancer Screening Never done    Zoster Vaccines Never done    Pap Smear 2022       COUNSELING/EDUCATION PERFORMED:   Contraception.  Form chosen:  menopausal  Cervical Cancer Screening  Breast Cancer Screening - monthly self breast exam    Colon Cancer screening  Pre- counseling and use of Folic Acid      follow up 1 yr or as needed  Leticia Egan MD

## 2024-03-19 LAB — HPV I/H RISK 1 DNA SPEC QL NAA+PROBE: NEGATIVE

## 2024-04-01 LAB
LAST PAP RESULT: NORMAL
PAP HISTORY (OTHER THAN LAST PAP): NORMAL

## 2024-04-24 ENCOUNTER — OFFICE VISIT (OUTPATIENT)
Dept: RHEUMATOLOGY | Facility: CLINIC | Age: 64
End: 2024-04-24
Payer: COMMERCIAL

## 2024-04-24 VITALS
WEIGHT: 162.81 LBS | RESPIRATION RATE: 16 BRPM | DIASTOLIC BLOOD PRESSURE: 72 MMHG | HEIGHT: 63 IN | BODY MASS INDEX: 28.85 KG/M2 | HEART RATE: 65 BPM | SYSTOLIC BLOOD PRESSURE: 129 MMHG

## 2024-04-24 DIAGNOSIS — M06.9 RHEUMATOID ARTHRITIS INVOLVING MULTIPLE SITES, UNSPECIFIED WHETHER RHEUMATOID FACTOR PRESENT (HCC): ICD-10-CM

## 2024-04-24 DIAGNOSIS — M32.9 SYSTEMIC LUPUS ERYTHEMATOSUS, UNSPECIFIED SLE TYPE, UNSPECIFIED ORGAN INVOLVEMENT STATUS (HCC): Primary | ICD-10-CM

## 2024-04-24 PROCEDURE — 99214 OFFICE O/P EST MOD 30 MIN: CPT | Performed by: INTERNAL MEDICINE

## 2024-04-24 RX ORDER — AMOXICILLIN 500 MG/1
500 CAPSULE ORAL
COMMUNITY
Start: 2024-04-18

## 2024-04-24 RX ORDER — HYDROXYCHLOROQUINE SULFATE 200 MG/1
200 TABLET, FILM COATED ORAL DAILY
Qty: 90 TABLET | Refills: 3 | Status: SHIPPED | OUTPATIENT
Start: 2024-04-24

## 2024-04-24 NOTE — PROGRESS NOTES
Lora Lopez is a 63 year old female who presents for   Chief Complaint   Patient presents with    SLE    Medication Follow-Up   .   HPI:     I had the pleasure of seeing Lora Lopez on 7/18/2023 for evaluation.     She ia pleasant 62 year old who has ahx of RA and SLE.. she was referred by Dr. Ervin.   She is on hydroxychlrqouine   She has not had much arthritis iseus like she used to .   She has had that for a while.       She was officially dx in 1998. She was sick for a long time. She was 19 likely with her first attack. She thought it was a sinus infection.   But at age 38 she hit rock bottom. She had a positive LINDSEY with her pcp.   She was sick - but not in the hospital. She was too sick to goto the doctor.  She had extreme fatigue. She had a lot of joint pain hai in her hands. She learned that eating better helped her.     She retired in December. 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   She was seeing rheumatologist - at [facility] - she was seeing pcp Dr. Leticia Mullins. Her rheumatologist was Dr. Tejada. She was seeing him for 20 years.   She was given steroids for any flares.   She was on gabapentin. But stopped this b/c she was too loopy and forgetful.   She gradually weaned off and her memory is better.     She hasn't had real pain in a while. She is better with foods and taking care of herself the best she can.     She has alopecia - and around her eye brows and crown.   No hx of malar rash. She never stayed in the sun. She occl gets rashes while driving and uses sleeves if it's a long drive.   She mainly has joint pain . No kidney function issue or breathing issues.     She's been on hydroxychrouine since 2000. She takes it once a day. She used to be on it three times a day and then this was taperd to 200mg twice a day . But then after awhile she was forgetting and so she has been on 200mg a day.   She never was on methotrexate.     She is  getting an eye exam twice a year.   She hasn't had one since last year.     She has been [facility] for her right knee - it's stiff.   She watches her 22 months granddaughter.   She has 3 daughters and she didn't have issues with delivery.     4/24/2024  She is here and doing ok.   She is feeling her aches and pains are ok.   She had to have some dental work done in march and April. She had some teeth removed and had one that was impacted. She had a little infection around that but the other one was in along the gum line - had a cyst .   She had surgery on that and had that removed.   Had a cyst noted and removed and the pathology was negative.   The pain is all in the right jaw.   shee's taking amoxicillin.   She has norco but too much consiptation - so taking advil/tylenol. She is just going to bed.   She's doing better. She has something stronger if she needs it.   She's off her routine of diet = just doing green smoothies.       No flares of the arthriits.   Her right 2nd finger was hurting a ltitle bit.   She was off schedule with her meds - like hasn't been taking her hydroxychlroquine.   She also had trouble with iburpofen and abx with esophageal discomfort so she stopped some her med.s   She was ok with the raynaud's this winter.   No change with breathing -   Normally doesn't get a lot of reflux - she feels it's from the medications.       Wt Readings from Last 2 Encounters:   04/24/24 162 lb 12.8 oz (73.8 kg)   03/18/24 162 lb 3.2 oz (73.6 kg)     Body mass index is 28.84 kg/m².      Current Outpatient Medications   Medication Sig Dispense Refill    amoxicillin 500 MG Oral Cap Take 1 capsule (500 mg total) by mouth.      levothyroxine 75 MCG Oral Tab Take 1 tablet (75 mcg total) by mouth every morning. 90 tablet 3    Cholecalciferol (VITAMIN D3) 25 MCG (1000 UT) Oral Cap       Cyanocobalamin (VITAMIN B 12) 500 MCG Oral Tab       hydroxychloroquine 200 MG Oral Tab Take 1 tablet (200 mg total) by  mouth daily. (Patient not taking: Reported on 2024) 90 tablet 1      Past Medical History:    Allergic rhinitis    Anemia    Arthritis    Hypothyroidism    Lupus (HCC)    Thyroid disease      Past Surgical History:   Procedure Laterality Date    Colonoscopy      Needle biopsy left      x's 3          Other surgical history Left     breast biospy      Family History   Problem Relation Age of Onset    Breast Cancer Mother 62    Cancer Mother         breast    Cancer Father         liver    Glaucoma Neg     Macular degeneration Neg     Diabetes Neg       Social History:father had RA, cousin with RA.   Social History     Socioeconomic History    Marital status:    Tobacco Use    Smoking status: Never    Smokeless tobacco: Never   Vaping Use    Vaping status: Never Used   Substance and Sexual Activity    Alcohol use: Not Currently     Alcohol/week: 1.0 standard drink of alcohol     Types: 1 Glasses of wine per week     Comment: occ    Drug use: Never    Sexual activity: Not Currently   Other Topics Concern    Blood Transfusions No     Social Determinants of Health      Received [facility], [facility]    Social Connections    Received from [facility], [facility]    Housing Stability    Retired - was doing pt . Registration        REVIEW OF SYSTEMS:   Review Of Systems:  Fatigue  Constitutional:No fever, no change in weight or appetitie  Derm: No rashes, no oral ulcers, no alopecia, no photosensitivity, no psoriasis  HEENT: No dry eyes, no dry mouth, no Raynaud's, no nasal ulcers, no parotid swelling, no neck pain, no jaw pain, no temple pain  Eyes: No visual changes,   CVS: No chest pain, no heart disease  RS: No SOB, no Cough, No Pleurtic pain,   GI: No nausea, no vomiiting, no abominal pain, no hx of ulcer, no gastritis, no heartburn, no dyshpagia, no BRBPR or melena  : no dysuria, no hx of miscarriages, no DVT  Hx, no hx of OCP,   Neuro: No numbness or tingling, no headache, no hx of seizures,   Psych: no hx of anxiety or depression  ENDO: no hx of thyroid disease, no hx of DM  Joint/Muscluskeltal: see HPI,   All other ROS are negative.     EXAM:   /72   Pulse 65   Resp 16   Ht 5' 3\" (1.6 m)   Wt 162 lb 12.8 oz (73.8 kg)   BMI 28.84 kg/m²   HEENT: Clear oropharynx, no oral ulcers, EOM intact, clear sclear, PERRLA, pleasant, no acute distress, no CAD,   Mild hypopigmenation around eye brows   Alopecia around eye brows     CVS: RRR, no murmurs  RS: CTAB, no crackles, no rhonchi  ABD: Soft Non tender, no HSM felt, BS positive  Joint exam:   no neck tendnerness, good ROM,   No joint tenderness  No synnvoits   Good cap refill  EXTREMITIES: no cyanosis, clubbing or edema  NEURO: intact touch, 5/5 ue and le strength    Component      Latest Ref Rng 11/29/2023   Glucose      70 - 99 mg/dL 97    Sodium      136 - 145 mmol/L 139    Potassium      3.5 - 5.1 mmol/L 4.0    Chloride      98 - 112 mmol/L 104    Carbon Dioxide, Total      21.0 - 32.0 mmol/L 28.0    ANION GAP      0 - 18 mmol/L 7    BUN      9 - 23 mg/dL 10    CREATININE      0.55 - 1.02 mg/dL 0.84    BUN/CREATININE RATIO      10.0 - 20.0  11.9    CALCIUM      8.7 - 10.4 mg/dL 9.8    CALCULATED OSMOLALITY      275 - 295 mOsm/kg 287    EGFR      >=60 mL/min/1.73m2 79    ALT (SGPT)      10 - 49 U/L 19    AST (SGOT)      <=34 U/L 25    ALKALINE PHOSPHATASE      50 - 130 U/L 72    Total Bilirubin      0.2 - 1.1 mg/dL 0.4    PROTEIN, TOTAL      5.7 - 8.2 g/dL 8.7 (H)    Albumin      3.2 - 4.8 g/dL 4.6    Globulin      2.8 - 4.4 g/dL 4.1    A/G Ratio      1.0 - 2.0  1.1    Patient Fasting for CMP? Yes    WBC      4.0 - 11.0 x10(3) uL 3.7 (L)    RBC      3.80 - 5.30 x10(6)uL 5.03    Hemoglobin      12.0 - 16.0 g/dL 13.9    Hematocrit      35.0 - 48.0 % 43.7    MCV      80.0 - 100.0 fL 86.9    MCH      26.0 - 34.0 pg 27.6    MCHC      31.0 - 37.0 g/dL 31.8    RDW       11.0 - 15.0 % 13.1    RDW-SD      35.1 - 46.3 fL 41.1    Platelet Count      150.0 - 450.0 10(3)uL 249.0    Anti-SSA Antibody, IGG      <7 U/mL 1.7    Anti-SSB Antibody, IGG      <7 U/mL 0.7    Anti-Smith Antibody, IGG      <7 U/mL 1.2    Anti-U1RNP Antibody, IGG      <5 U/mL 114.0 (H)    Anti-RNP70 Antibody, IGG      <7 U/mL 135.0 (H)    Anti-Centromere Antibody, IGG      <7 U/mL 1.1    Anti-SCL70 Antibody, IGG      <7 U/mL 0.8    Anti-Felisha-1 Antibody, IGG      <7 U/mL 0.4    Expanded JENNIFER Antibody Screen, IGG      <0.7 ug/l 26.00 (H)    Anti-dsDNA antibody      <10 IU/mL 1.4    Connective Tissue Disease Screen Interpretation      Negative  Positive !    LINDSEY Titer/Pattern      <80  1280 !    Reviewed By: Yessica Reyes M.D.    LINDSEY SCREEN      Negative  Positive !    SED RATE      0 - 30 mm/Hr 21    C-REACTIVE PROTEIN      <1.00 mg/dL <0.40    COMPLEMENT C3      90.0 - 170.0 mg/dL 136.8    COMPLEMENT C4      12.0 - 36.0 mg/dL 21.1    RHEUMATOID FACTOR      <14 IU/mL 18 (H)    C-Citrullinated Peptide IgG AB      0.0 - 6.9 U/mL 1.7    CK      34 - 145 U/L 110    Aldolase      3.3 - 10.3 U/L 3.6    VITAMIN D, 25-OH, TOTAL      30.0 - 100.0 ng/mL 39.1       Legend:  (H) High  (L) Low  ! Abnormal    ASSESSMENT AND PLAN:   Lora Lopez is a 63 year old female who presents for   Chief Complaint   Patient presents with    SLE    Medication Follow-Up     Lupus /RA overap - re-establishing care - has raydarvin's . Mild leukopenia - pos RNP and Scl 70. , RF 18 - borderline positive -   - stable   - check labs in 1 months   - cont. Hydroxychrqouine 200mg a day = on this for 20 years -   - has to get eye exam every 6 months - last one is 10/2023 -   Hx of myalgia - weak in hands - normal muscles tests - doing hand exerces     Consider baseline echo - and pfts -     2. Hypothryoidism - cont levothyroixine    3. Vit d def - normal vit d level    Summary:  Check labs in 1 month   Cont. Hydroxychlroquine 200mg a day -   Return to clinic in  6-12 months.       Ariella Rizzo MD  7/18/2023  4:05 PM

## 2024-04-24 NOTE — PATIENT INSTRUCTIONS
Check labs in 1 month   Cont. Hydroxychlroquine 200mg a day -   Return to clinic in 6-12 months.

## 2024-05-25 RX ORDER — LEVOTHYROXINE SODIUM 0.07 MG/1
75 TABLET ORAL EVERY MORNING
Qty: 90 TABLET | Refills: 3 | Status: SHIPPED | OUTPATIENT
Start: 2024-05-25

## 2024-05-26 NOTE — TELEPHONE ENCOUNTER
REFILL PASSED PER [facility] PROTOCOLS    Requested Prescriptions   Pending Prescriptions Disp Refills    LEVOTHYROXINE 75 MCG Oral Tab [Pharmacy Med Name: Levothyroxine Sodium 75 Mcg Tab Lupi] 90 tablet 0     Sig: Take 1 tablet (75 mcg total) by mouth every morning.       Thyroid Medication Protocol Passed - 5/23/2024  1:30 AM        Passed - TSH in past 12 months        Passed - Last TSH value is normal     Lab Results   Component Value Date    TSH 1.550 06/16/2023                 Passed - In person appointment or virtual visit in the past 12 mos or appointment in next 3 mos     Recent Outpatient Visits              1 month ago Systemic lupus erythematosus, unspecified SLE type, unspecified organ involvement status ([facility])    [facility] Ariella Rizzo MD    Office Visit    2 months ago Encounter for annual routine gynecological examination    [facility] - OB/GYN Leticia Egan MD    Office Visit    2 months ago Physical exam, annual    [facility] Mimi Ervin MD    Office Visit    7 months ago Long-term use of Plaquenil    [facility] Smith Chamorro MD    Office Visit    10 months ago Systemic lupus erythematosus, unspecified SLE type, unspecified organ involvement status ([facility])    [facility] Ariella Rizzo MD    Office Visit          Future Appointments         Provider Department Appt Notes    In 4 months Smith Chamorro MD [facility] EP PLAQUENIL EE    In 5 months Ariella Rizzo MD [facility] follow up    In 9 months Mimi Ervin MD [facility] PX last 3/7/24                         Future Appointments         Provider Department Appt Notes     In 4 months Smith Chamorro MD [facility] [place] EP PLAQUENIL EE    In 5 months Ariella Rizzo MD [facility] [place] [facility] follow up    In 9 months Mimi Ervin MD [facility] last 3/7/24          Recent Outpatient Visits              1 month ago Systemic lupus erythematosus, unspecified SLE type, unspecified organ involvement status (HCC)    [facility] [place] Ariella Rizzo MD    Office Visit    2 months ago Encounter for annual routine gynecological examination    [facility] [place] [facility] Leticia Egan MD    Office Visit    2 months ago Physical exam, annual    [facility] Mimi Ervin MD    Office Visit    7 months ago Long-term use of Plaquenil    [facility] [place] Smith Chamorro MD    Office Visit    10 months ago Systemic lupus erythematosus, unspecified SLE type, unspecified organ involvement status (HCC)    [facility] [place] Ariella Rizzo MD    Office Visit

## 2024-07-09 ENCOUNTER — PATIENT MESSAGE (OUTPATIENT)
Dept: INTERNAL MEDICINE CLINIC | Facility: CLINIC | Age: 64
End: 2024-07-09

## 2024-07-09 NOTE — TELEPHONE ENCOUNTER
From: Lora Lopez  To: Mimi Ervin  Sent: 7/9/2024 7:46 AM CDT  Subject: Medication     Good morning ,  I will be going to [place] next week I would like a prescription for altitude sickness pills   For 1 week supply.  Thanking you in advance,  Lora Lopez

## 2024-07-12 NOTE — TELEPHONE ENCOUNTER
Dr Ervin, please see patient's mychart response declining to schedule a visit as you recommended.

## 2024-07-12 NOTE — TELEPHONE ENCOUNTER
Not All patients need medications when they are going to [place] or for hospitals, this needs to be documented  I have never given this to her so I will not be able to \"refill it \"   Any avail provider is fine

## 2024-07-17 ENCOUNTER — TELEMEDICINE (OUTPATIENT)
Dept: INTERNAL MEDICINE CLINIC | Facility: CLINIC | Age: 64
End: 2024-07-17
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
## 2024-07-17 PROCEDURE — 99213 OFFICE O/P EST LOW 20 MIN: CPT

## 2024-07-17 RX ORDER — ACETAZOLAMIDE 125 MG/1
125 TABLET ORAL 2 TIMES DAILY
Qty: 10 TABLET | Refills: 0 | Status: SHIPPED | OUTPATIENT
Start: 2024-07-17 | End: 2024-07-22

## 2024-07-17 NOTE — PROGRESS NOTES
This is a telemedicine visit with live, interactive video and audio.     Patient understands and accepts financial responsibility for any deductible, co-insurance and/or co-pays associated with this service.    SUBJECTIVE  Will be travelling to [place] soon where she will be at an altitude of over 10,000 feet. She goes to visit her [place]. She has been prescribed acetazolamide by her previous provider and has tolerated this well without any adverse effects. She has never experienced altitude sickness and denies any underlying lung issues. She has relatives who go to [place] and have to be on oxygen. Would like preventative medication. [place] will [place] the [place].     HISTORY:  Past Medical History:    Allergic rhinitis    Anemia    Arthritis    Hypothyroidism    Lupus (HCC)    Thyroid disease      Past Surgical History:   Procedure Laterality Date    Colonoscopy      Needle biopsy left      x's 3          Other surgical history Left     breast biospy      Family History   Problem Relation Age of Onset    Breast Cancer Mother 62    Cancer Mother         breast    Cancer Father         liver    Glaucoma Neg     Macular degeneration Neg     Diabetes Neg       Social History     Socioeconomic History    Marital status:    Tobacco Use    Smoking status: Never    Smokeless tobacco: Never   Vaping Use    Vaping status: Never Used   Substance and Sexual Activity    Alcohol use: Not Currently     Alcohol/week: 1.0 standard drink of alcohol     Types: 1 Glasses of wine per week     Comment: occ    Drug use: Never    Sexual activity: Not Currently   Other Topics Concern    Blood Transfusions No     Social Determinants of Health      Received from [facility], [facility]    Social Connections    Received from [facility], [facility]    Housing Stability        Allergies   Allergen Reactions     Bupropion ITCHING, ANGIOEDEMA, UNKNOWN and NAUSEA AND VOMITING    Pineapple OTHER (SEE COMMENTS)     Itchy tongue       Current Outpatient Medications   Medication Sig Dispense Refill    acetaZOLAMIDE 125 MG Oral Tab Take 1 tablet (125 mg total) by mouth 2 (two) times daily for 5 days. Start the day before you are ascending to high altitudes. You can stop the medication after 2-4 days at the same elevation or if you start to descend. 10 tablet 0    levothyroxine 75 MCG Oral Tab Take 1 tablet (75 mcg total) by mouth every morning. 90 tablet 3    amoxicillin 500 MG Oral Cap Take 1 capsule (500 mg total) by mouth.      hydroxychloroquine 200 MG Oral Tab Take 1 tablet (200 mg total) by mouth daily. 90 tablet 3    Cholecalciferol (VITAMIN D3) 25 MCG (1000 UT) Oral Cap       Cyanocobalamin (VITAMIN B 12) 500 MCG Oral Tab        OBJECTIVE  Physical Exam:   alert, appears stated age, cooperative, and no distress, Speaking in full sentences comfortably, and Normal work of breathing    ASSESSMENT & PLAN  Diagnoses and all orders for this visit:    Exposure to residence or prolonged visit at high altitude, initial encounter  -     acetaZOLAMIDE 125 MG Oral Tab; Take 1 tablet (125 mg total) by mouth 2 (two) times daily for 5 days. Start the day before you are ascending to high altitudes. You can stop the medication after 2-4 days at the same elevation or if you start to descend.          MONCHO Romano
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
## 2024-07-24 RX ORDER — ACETAZOLAMIDE 125 MG/1
TABLET ORAL
Qty: 10 TABLET | Refills: 0 | OUTPATIENT
Start: 2024-07-24

## 2024-10-15 ENCOUNTER — IMMUNIZATION (OUTPATIENT)
Dept: LAB | Age: 64
End: 2024-10-15
Attending: EMERGENCY MEDICINE
Payer: COMMERCIAL

## 2024-10-15 DIAGNOSIS — Z23 NEED FOR VACCINATION: Primary | ICD-10-CM

## 2024-10-15 PROCEDURE — 90471 IMMUNIZATION ADMIN: CPT

## 2024-10-15 PROCEDURE — 90480 ADMN SARSCOV2 VAC 1/ONLY CMP: CPT

## 2024-10-15 PROCEDURE — 90656 IIV3 VACC NO PRSV 0.5 ML IM: CPT

## 2024-10-28 ENCOUNTER — TELEPHONE (OUTPATIENT)
Dept: INTERNAL MEDICINE CLINIC | Facility: CLINIC | Age: 64
End: 2024-10-28

## 2024-10-28 NOTE — TELEPHONE ENCOUNTER
Patient has labs and asking if fasting is needed.     Has lab orders from Dr Ervin and Rheum. Advised best to fast. Patient verbalized understanding.

## 2024-10-31 ENCOUNTER — LAB ENCOUNTER (OUTPATIENT)
Dept: LAB | Facility: HOSPITAL | Age: 64
End: 2024-10-31
Attending: INTERNAL MEDICINE
Payer: COMMERCIAL

## 2024-10-31 ENCOUNTER — TELEPHONE (OUTPATIENT)
Dept: CASE MANAGEMENT | Age: 64
End: 2024-10-31

## 2024-10-31 DIAGNOSIS — M32.9 SYSTEMIC LUPUS ERYTHEMATOSUS, UNSPECIFIED SLE TYPE, UNSPECIFIED ORGAN INVOLVEMENT STATUS (HCC): Primary | ICD-10-CM

## 2024-10-31 LAB
ALBUMIN SERPL-MCNC: 4.3 G/DL (ref 3.2–4.8)
ALBUMIN/GLOB SERPL: 1.2 {RATIO} (ref 1–2)
ALP LIVER SERPL-CCNC: 72 U/L
ALT SERPL-CCNC: 25 U/L
ANION GAP SERPL CALC-SCNC: 7 MMOL/L (ref 0–18)
AST SERPL-CCNC: 25 U/L (ref ?–34)
BILIRUB SERPL-MCNC: 0.3 MG/DL (ref 0.2–1.1)
BILIRUB UR QL: NEGATIVE
BUN BLD-MCNC: 9 MG/DL (ref 9–23)
BUN/CREAT SERPL: 11.4 (ref 10–20)
CALCIUM BLD-MCNC: 9.2 MG/DL (ref 8.7–10.4)
CHLORIDE SERPL-SCNC: 109 MMOL/L (ref 98–112)
CHOLEST SERPL-MCNC: 139 MG/DL (ref ?–200)
CLARITY UR: CLEAR
CO2 SERPL-SCNC: 26 MMOL/L (ref 21–32)
CREAT BLD-MCNC: 0.79 MG/DL
CRP SERPL-MCNC: <0.4 MG/DL (ref ?–1)
DEPRECATED RDW RBC AUTO: 42.9 FL (ref 35.1–46.3)
EGFRCR SERPLBLD CKD-EPI 2021: 84 ML/MIN/1.73M2 (ref 60–?)
ERYTHROCYTE [DISTWIDTH] IN BLOOD BY AUTOMATED COUNT: 13.4 % (ref 11–15)
ERYTHROCYTE [SEDIMENTATION RATE] IN BLOOD: 33 MM/HR
FASTING PATIENT LIPID ANSWER: YES
FASTING STATUS PATIENT QL REPORTED: YES
GLOBULIN PLAS-MCNC: 3.6 G/DL (ref 2–3.5)
GLUCOSE BLD-MCNC: 100 MG/DL (ref 70–99)
GLUCOSE UR-MCNC: NORMAL MG/DL
HCT VFR BLD AUTO: 40.4 %
HDLC SERPL-MCNC: 51 MG/DL (ref 40–59)
HGB BLD-MCNC: 13.4 G/DL
HGB UR QL STRIP.AUTO: NEGATIVE
KETONES UR-MCNC: NEGATIVE MG/DL
LDLC SERPL CALC-MCNC: 74 MG/DL (ref ?–100)
LEUKOCYTE ESTERASE UR QL STRIP.AUTO: 500
MCH RBC QN AUTO: 28.9 PG (ref 26–34)
MCHC RBC AUTO-ENTMCNC: 33.2 G/DL (ref 31–37)
MCV RBC AUTO: 87.3 FL
NITRITE UR QL STRIP.AUTO: NEGATIVE
NONHDLC SERPL-MCNC: 88 MG/DL (ref ?–130)
OSMOLALITY SERPL CALC.SUM OF ELEC: 293 MOSM/KG (ref 275–295)
PH UR: 7 [PH] (ref 5–8)
PLATELET # BLD AUTO: 231 10(3)UL (ref 150–450)
POTASSIUM SERPL-SCNC: 4.1 MMOL/L (ref 3.5–5.1)
PROT SERPL-MCNC: 7.9 G/DL (ref 5.7–8.2)
PROT UR-MCNC: NEGATIVE MG/DL
RBC # BLD AUTO: 4.63 X10(6)UL
SODIUM SERPL-SCNC: 142 MMOL/L (ref 136–145)
SP GR UR STRIP: 1.01 (ref 1–1.03)
TRIGL SERPL-MCNC: 70 MG/DL (ref 30–149)
TSI SER-ACNC: 1.34 MIU/ML (ref 0.55–4.78)
UROBILINOGEN UR STRIP-ACNC: NORMAL
VLDLC SERPL CALC-MCNC: 11 MG/DL (ref 0–30)
WBC # BLD AUTO: 3.8 X10(3) UL (ref 4–11)

## 2024-10-31 PROCEDURE — 81001 URINALYSIS AUTO W/SCOPE: CPT | Performed by: INTERNAL MEDICINE

## 2024-10-31 PROCEDURE — 85652 RBC SED RATE AUTOMATED: CPT | Performed by: INTERNAL MEDICINE

## 2024-10-31 PROCEDURE — 84443 ASSAY THYROID STIM HORMONE: CPT | Performed by: INTERNAL MEDICINE

## 2024-10-31 PROCEDURE — 86140 C-REACTIVE PROTEIN: CPT | Performed by: INTERNAL MEDICINE

## 2024-10-31 PROCEDURE — 80053 COMPREHEN METABOLIC PANEL: CPT | Performed by: INTERNAL MEDICINE

## 2024-10-31 PROCEDURE — 36415 COLL VENOUS BLD VENIPUNCTURE: CPT | Performed by: INTERNAL MEDICINE

## 2024-10-31 PROCEDURE — 80061 LIPID PANEL: CPT | Performed by: INTERNAL MEDICINE

## 2024-10-31 PROCEDURE — 85027 COMPLETE CBC AUTOMATED: CPT | Performed by: INTERNAL MEDICINE

## 2024-10-31 NOTE — TELEPHONE ENCOUNTER
Dr. Ervin,     Patient called requesting referral to Dr. Rizzo.     Pended referral please review diagnosis and sign off if you agree.    Thank you.  Felisha Mccann  Managed Care    
signed  
Cigarettes

## 2024-11-04 ENCOUNTER — OFFICE VISIT (OUTPATIENT)
Dept: RHEUMATOLOGY | Facility: CLINIC | Age: 64
End: 2024-11-04
Payer: COMMERCIAL

## 2024-11-04 VITALS
SYSTOLIC BLOOD PRESSURE: 121 MMHG | RESPIRATION RATE: 16 BRPM | WEIGHT: 164.19 LBS | HEIGHT: 63 IN | BODY MASS INDEX: 29.09 KG/M2 | DIASTOLIC BLOOD PRESSURE: 79 MMHG | HEART RATE: 90 BPM

## 2024-11-04 DIAGNOSIS — M06.9 RHEUMATOID ARTHRITIS INVOLVING MULTIPLE SITES, UNSPECIFIED WHETHER RHEUMATOID FACTOR PRESENT (HCC): ICD-10-CM

## 2024-11-04 DIAGNOSIS — M32.9 SYSTEMIC LUPUS ERYTHEMATOSUS, UNSPECIFIED SLE TYPE, UNSPECIFIED ORGAN INVOLVEMENT STATUS (HCC): Primary | ICD-10-CM

## 2024-11-04 PROCEDURE — 99214 OFFICE O/P EST MOD 30 MIN: CPT | Performed by: INTERNAL MEDICINE

## 2024-11-04 RX ORDER — PREDNISONE 5 MG/1
TABLET ORAL
Qty: 21 TABLET | Refills: 0 | Status: SHIPPED | OUTPATIENT
Start: 2024-11-04

## 2024-11-04 NOTE — PROGRESS NOTES
Lora Lopez is a 63 year old female who presents for   Chief Complaint   Patient presents with    SLE    Medication Follow-Up    Lab Results   .   HPI:     I had the pleasure of seeing Lora Lopez on 7/18/2023 for evaluation.     She ia pleasant 62 year old who has ahx of RA and SLE.. she was referred by Dr. Ervin.   She is on hydroxychlrqouine   She has not had much arthritis iseus like she used to .   She has had that for a while.       She was officially dx in 1998. She was sick for a long time. She was 19 likely with her first attack. She thought it was a sinus infection.   But at age 38 she hit rock bottom. She had a positive LINDSEY with her pcp.   She was sick - but not in the hospital. She was too sick to goto the doctor.  She had extreme fatigue. She had a lot of joint pain hai in her hands. She learned that eating better helped her.     She retired in December. 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   She was seeing rheumatologist - at [facility] - she was seeing pcp Dr. Leticia Mullins. Her rheumatologist was Dr. Tejada. She was seeing him for 20 years.   She was given steroids for any flares.   She was on gabapentin. But stopped this b/c she was too loopy and forgetful.   She gradually weaned off and her memory is better.     She hasn't had real pain in a while. She is better with foods and taking care of herself the best she can.     She has alopecia - and around her eye brows and crown.   No hx of malar rash. She never stayed in the sun. She occl gets rashes while driving and uses sleeves if it's a long drive.   She mainly has joint pain . No kidney function issue or breathing issues.     She's been on hydroxychrouine since 2000. She takes it once a day. She used to be on it three times a day and then this was taperd to 200mg twice a day . But then after awhile she was forgetting and so she has been on 200mg a day.   She never was on methotrexate.      She is getting an eye exam twice a year.   She hasn't had one since last year.     She has been [facility] her right knee - it's stiff.   She watches her 22 months granddaughter.   She has 3 daughters and she didn't have issues with delivery.     4/24/2024  She is here and doing ok.   She is feeling her aches and pains are ok.   She had to have some dental work done in march and April. She had some teeth removed and had one that was impacted. She had a little infection around that but the other one was in along the gum line - had a cyst .   She had surgery on that and had that removed.   Had a cyst noted and removed and the pathology was negative.   The pain is all in the right jaw.   shee's taking amoxicillin.   She has norco but too much consiptation - so taking advil/tylenol. She is just going to bed.   She's doing better. She has something stronger if she needs it.   She's off her routine of diet = just doing green smoothies.       No flares of the arthriits.   Her right 2nd finger was hurting a ltitle bit.   She was off schedule with her meds - like hasn't been taking her hydroxychlroquine.   She also had trouble with iburpofen and abx with esophageal discomfort so she stopped some her med.s   She was ok with the raynaud's this winter.   No change with breathing -   Normally doesn't get a lot of reflux - she feels it's from the medications.     11/4/2024  She noticed she's been really tired the last 1 month. With the weather changed.   She started increasing her hcq to 200mg bid for the last 1 month .   She is ok other than that - her fatigue is just her main complaint.     No joint pain or flares   Her feet hurts  a little bit on the sides.and her elbows.   She's eating garbage again. She feels if she eats better seh feels better.   She is not eating candy but she is not eating as many vegetables -       Wt Readings from Last 2 Encounters:   11/04/24 164 lb 3.2 oz (74.5 kg)   04/24/24 162 lb  12.8 oz (73.8 kg)     Body mass index is 29.09 kg/m².      Current Outpatient Medications   Medication Sig Dispense Refill    XYZAL ALLERGY 24HR OR Take by mouth.      levothyroxine 75 MCG Oral Tab Take 1 tablet (75 mcg total) by mouth every morning. 90 tablet 3    hydroxychloroquine 200 MG Oral Tab Take 1 tablet (200 mg total) by mouth daily. 90 tablet 3    Cholecalciferol (VITAMIN D3) 25 MCG (1000 UT) Oral Cap       Cyanocobalamin (VITAMIN B 12) 500 MCG Oral Tab         Past Medical History:    Allergic rhinitis    Anemia    Arthritis    Hypothyroidism    Lupus    Thyroid disease      Past Surgical History:   Procedure Laterality Date    Colonoscopy      Needle biopsy left      x's 3          Other surgical history Left     breast biospy      Family History   Problem Relation Age of Onset    Breast Cancer Mother 62    Cancer Mother         breast    Cancer Father         liver    Glaucoma Neg     Macular degeneration Neg     Diabetes Neg       Social History:father had RA, cousin with RA.   Social History     Socioeconomic History    Marital status:    Tobacco Use    Smoking status: Never    Smokeless tobacco: Never   Vaping Use    Vaping status: Never Used   Substance and Sexual Activity    Alcohol use: Not Currently     Alcohol/week: 1.0 standard drink of alcohol     Types: 1 Glasses of wine per week     Comment: occ    Drug use: Never    Sexual activity: Not Currently   Other Topics Concern    Blood Transfusions No     Social Drivers of Health      [facility]    Social Connections    [facility]    Housing Stability    Retired - was doing pt . Registration        REVIEW OF SYSTEMS:   Review Of Systems:  Fatigue  Constitutional:No fever, no change in weight or appetitie  Derm: No rashes, no oral ulcers, no alopecia, no photosensitivity, no psoriasis  HEENT: No dry  eyes, no dry mouth, no Raynaud's, no nasal ulcers, no parotid swelling, no neck pain, no jaw pain, no temple pain  Eyes: No visual changes,   CVS: No chest pain, no heart disease  RS: No SOB, no Cough, No Pleurtic pain,   GI: No nausea, no vomiiting, no abominal pain, no hx of ulcer, no gastritis, no heartburn, no dyshpagia, no BRBPR or melena  : no dysuria, no hx of miscarriages, no DVT Hx, no hx of OCP,   Neuro: No numbness or tingling, no headache, no hx of seizures,   Psych: no hx of anxiety or depression  ENDO: no hx of thyroid disease, no hx of DM  Joint/Muscluskeltal: see HPI,   All other ROS are negative.     EXAM:   /79   Pulse 90   Resp 16   Ht 5' 3\" (1.6 m)   Wt 164 lb 3.2 oz (74.5 kg)   BMI 29.09 kg/m²   HEENT: Clear oropharynx, no oral ulcers, EOM intact, clear sclear, PERRLA, pleasant, no acute distress, no CAD,   Mild hypopigmenation around eye brows   Alopecia around eye brows   CVS: RRR, no murmurs  RS: CTAB, no crackles, no rhonchi  ABD: Soft Non tender, no HSM felt, BS positive  Joint exam:   no neck tendnerness, good ROM,   No joint tenderness  No synnvoits   Good cap refill  EXTREMITIES: no cyanosis, clubbing or edema  NEURO: intact touch, 5/5 ue and le strength    Component      Latest Ref Rng 11/29/2023   Glucose      70 - 99 mg/dL 97    Sodium      136 - 145 mmol/L 139    Potassium      3.5 - 5.1 mmol/L 4.0    Chloride      98 - 112 mmol/L 104    Carbon Dioxide, Total      21.0 - 32.0 mmol/L 28.0    ANION GAP      0 - 18 mmol/L 7    BUN      9 - 23 mg/dL 10    CREATININE      0.55 - 1.02 mg/dL 0.84    BUN/CREATININE RATIO      10.0 - 20.0  11.9    CALCIUM      8.7 - 10.4 mg/dL 9.8    CALCULATED OSMOLALITY      275 - 295 mOsm/kg 287    EGFR      >=60 mL/min/1.73m2 79    ALT (SGPT)      10 - 49 U/L 19    AST (SGOT)      <=34 U/L 25    ALKALINE PHOSPHATASE      50 - 130 U/L 72    Total Bilirubin      0.2 - 1.1 mg/dL 0.4    PROTEIN, TOTAL      5.7 - 8.2 g/dL 8.7 (H)    Albumin       3.2 - 4.8 g/dL 4.6    Globulin      2.8 - 4.4 g/dL 4.1    A/G Ratio      1.0 - 2.0  1.1    Patient Fasting for CMP? Yes    WBC      4.0 - 11.0 x10(3) uL 3.7 (L)    RBC      3.80 - 5.30 x10(6)uL 5.03    Hemoglobin      12.0 - 16.0 g/dL 13.9    Hematocrit      35.0 - 48.0 % 43.7    MCV      80.0 - 100.0 fL 86.9    MCH      26.0 - 34.0 pg 27.6    MCHC      31.0 - 37.0 g/dL 31.8    RDW      11.0 - 15.0 % 13.1    RDW-SD      35.1 - 46.3 fL 41.1    Platelet Count      150.0 - 450.0 10(3)uL 249.0    Anti-SSA Antibody, IGG      <7 U/mL 1.7    Anti-SSB Antibody, IGG      <7 U/mL 0.7    Anti-Smith Antibody, IGG      <7 U/mL 1.2    Anti-U1RNP Antibody, IGG      <5 U/mL 114.0 (H)    Anti-RNP70 Antibody, IGG      <7 U/mL 135.0 (H)    Anti-Centromere Antibody, IGG      <7 U/mL 1.1    Anti-SCL70 Antibody, IGG      <7 U/mL 0.8    Anti-Felisha-1 Antibody, IGG      <7 U/mL 0.4    Expanded JENNIFER Antibody Screen, IGG      <0.7 ug/l 26.00 (H)    Anti-dsDNA antibody      <10 IU/mL 1.4    Connective Tissue Disease Screen Interpretation      Negative  Positive !    LINDSEY Titer/Pattern      <80  1280 !    Reviewed By: Yessica Reyes M.D.    LINDSEY SCREEN      Negative  Positive !    SED RATE      0 - 30 mm/Hr 21    C-REACTIVE PROTEIN      <1.00 mg/dL <0.40    COMPLEMENT C3      90.0 - 170.0 mg/dL 136.8    COMPLEMENT C4      12.0 - 36.0 mg/dL 21.1    RHEUMATOID FACTOR      <14 IU/mL 18 (H)    C-Citrullinated Peptide IgG AB      0.0 - 6.9 U/mL 1.7    CK      34 - 145 U/L 110    Aldolase      3.3 - 10.3 U/L 3.6    VITAMIN D, 25-OH, TOTAL      30.0 - 100.0 ng/mL 39.1       Component      Latest Ref Rng 10/31/2024   Glucose      70 - 99 mg/dL 100 (H)    Sodium      136 - 145 mmol/L 142    Potassium      3.5 - 5.1 mmol/L 4.1    Chloride      98 - 112 mmol/L 109    Carbon Dioxide, Total      21.0 - 32.0 mmol/L 26.0    ANION GAP      0 - 18 mmol/L 7    BUN      9 - 23 mg/dL 9    CREATININE      0.55 - 1.02 mg/dL 0.79    BUN/CREATININE RATIO      10.0 - 20.0   11.4    CALCIUM      8.7 - 10.4 mg/dL 9.2    CALCULATED OSMOLALITY      275 - 295 mOsm/kg 293    EGFR      >=60 mL/min/1.73m2 84    ALT (SGPT)      10 - 49 U/L 25    AST (SGOT)      <34 U/L 25    ALKALINE PHOSPHATASE      50 - 130 U/L 72    Total Bilirubin      0.2 - 1.1 mg/dL 0.3    PROTEIN, TOTAL      5.7 - 8.2 g/dL 7.9    Albumin      3.2 - 4.8 g/dL 4.3    Globulin      2.0 - 3.5 g/dL 3.6 (H)    A/G Ratio      1.0 - 2.0  1.2    Patient Fasting for CMP? Yes    Color Urine      Yellow  Light-Yellow    Clarity Urine      Clear  Clear    Spec Gravity      1.005 - 1.030  1.013    Glucose Urine      Normal mg/dL Normal    Bilirubin Urine      Negative  Negative    Ketones, UA      Negative mg/dL Negative    Blood Urine      Negative  Negative    PH Urine      5.0 - 8.0  7.0    Protein Urine      Negative mg/dL Negative    Urobilinogen Urine      Normal  Normal    Nitrite Urine      Negative  Negative    Leukocyte Esterase       Negative  500 !    WBC Urine      0 - 5 /HPF 21-50 !    RBC Urine      0 - 2 /HPF 0-2    Bacteria Urine      None Seen /HPF Rare !    SQUAM EPI CELLS UR      None Seen /HPF Few !    RENAL TUBULAR EPITHELIAL CELLS      None Seen /HPF None Seen    TRANSITIONAL EPI CELLS      None Seen /HPF None Seen    YEAST URINE      None Seen /HPF None Seen    WBC      4.0 - 11.0 x10(3) uL 3.8 (L)    RBC      3.80 - 5.30 x10(6)uL 4.63    Hemoglobin      12.0 - 16.0 g/dL 13.4    Hematocrit      35.0 - 48.0 % 40.4    MCV      80.0 - 100.0 fL 87.3    MCH      26.0 - 34.0 pg 28.9    MCHC      31.0 - 37.0 g/dL 33.2    RDW      11.0 - 15.0 % 13.4    RDW-SD      35.1 - 46.3 fL 42.9    Platelet Count      150.0 - 450.0 10(3)uL 231.0    TSH      0.550 - 4.780 mIU/mL 1.341    SED RATE      0 - 30 mm/Hr 33 (H)    C-REACTIVE PROTEIN      <1.00 mg/dL <0.40       Legend:  (H) High  ! Abnormal  (L) Low    ASSESSMENT AND PLAN:   Lora Lopez is a 63 year old female who presents for   Chief Complaint   Patient presents with     SLE    Medication Follow-Up    Lab Results     Lupus /RA overap - re-establishing care - has raydarvin's . Mild leukopenia - pos RNP and Scl 70. , RF 18 - borderline positive -   - increasingly fatigued - and sed rate is 33mm/hr. - minor flar up - with some joint pains -   - cont. Hydroxychrqouine 200mg a day = on this for 20 years - - increased the hcq to 200mg bid - for 1 month - hopefully this helps with joitn pain and fatigued   If not feeling better with this - ok to take prednisone 30mg burst over 6 days or 10mg x 1 week, then 5mg x 1 week, then off   - has to get eye exam every 6 months - last one is 10/2023 -   Hx of myalgia - weak in hands - normal muscles tests - doing hand exerces     Consider baseline echo - and pfts -     2. Hypothryoidism - cont levothyroixine    3. Vit d def - normal vit d level    4 abnormal urine - no dysuria - no signes of UTI -     Summary:  Try prednsone 10mg x 1 week, then 5mg a day x 1 week, then off - take if flare up worsens  increase. Hydroxychlroquine 200mg twice a day - for now -   Return to clinic in 6-12 months.   If not feeling better, then can check back in     Ariella Rizzo MD  11/4/2024   4:19 PM     is applicable because the patient's medical record notes reflects the ongoing nature of the continuous longitudinal relationship of care, and the medical record indicates that there is ongoing treatment of a serious/complex medical condition.

## 2024-11-04 NOTE — PATIENT INSTRUCTIONS
Try prednsone 10mg x 1 week, then 5mg a day x 1 week, then off - take if flare up worsens  increase. Hydroxychlroquine 200mg twice a day - for now -   Return to clinic in 6-12 months.   If not feeling better, then can check back in

## 2024-11-12 ENCOUNTER — TELEPHONE (OUTPATIENT)
Dept: INTERNAL MEDICINE CLINIC | Facility: CLINIC | Age: 64
End: 2024-11-12

## 2024-11-12 DIAGNOSIS — Z12.31 SCREENING MAMMOGRAM FOR BREAST CANCER: Primary | ICD-10-CM

## 2024-12-10 ENCOUNTER — HOSPITAL ENCOUNTER (OUTPATIENT)
Dept: MAMMOGRAPHY | Facility: HOSPITAL | Age: 64
Discharge: HOME | End: 2024-12-10
Attending: INTERNAL MEDICINE
Payer: COMMERCIAL

## 2024-12-10 DIAGNOSIS — Z12.31 SCREENING MAMMOGRAM FOR BREAST CANCER: ICD-10-CM

## 2024-12-10 PROCEDURE — 77063 BREAST TOMOSYNTHESIS BI: CPT | Performed by: INTERNAL MEDICINE

## 2024-12-10 PROCEDURE — 77067 SCR MAMMO BI INCL CAD: CPT | Performed by: INTERNAL MEDICINE

## 2025-01-02 ENCOUNTER — HOSPITAL ENCOUNTER (OUTPATIENT)
Dept: ULTRASOUND IMAGING | Facility: HOSPITAL | Age: 65
Discharge: HOME | End: 2025-01-02
Attending: INTERNAL MEDICINE
Payer: COMMERCIAL

## 2025-01-02 ENCOUNTER — HOSPITAL ENCOUNTER (OUTPATIENT)
Dept: MAMMOGRAPHY | Facility: HOSPITAL | Age: 65
Discharge: HOME | End: 2025-01-02
Attending: INTERNAL MEDICINE
Payer: COMMERCIAL

## 2025-01-02 DIAGNOSIS — R92.8 ABNORMAL MAMMOGRAM: ICD-10-CM

## 2025-01-02 PROCEDURE — 77065 DX MAMMO INCL CAD UNI: CPT | Performed by: INTERNAL MEDICINE

## 2025-01-02 PROCEDURE — 77061 BREAST TOMOSYNTHESIS UNI: CPT | Performed by: INTERNAL MEDICINE

## 2025-01-02 PROCEDURE — 76642 ULTRASOUND BREAST LIMITED: CPT | Performed by: INTERNAL MEDICINE

## 2025-01-02 NOTE — IMAGING NOTE
This nurse introduced self and role of breast coordinator.  Discussed recommended breast biopsy with patient. Pt was recommended by   to have a right  breast and right agustin for distortion ultrasound guided biopsy. Spouses name is Serge  has 3 dtrs ages 36,34,32. Pt is retired.  Super order placed was provided 1/14 Tuesday checking in at 615 am [facility].   Hx lupus on plaquenil    Pt history discussed as below:  Pt history of biopsy:yes 2003 benign   Family history of cancer: yes mom dx 62 passed at 67  Pt history of breast cancer:no   Hx BCP use:        5 years            HRT use: no ivf no      RECOMMENDATIONS:   ULTRASOUND-GUIDED CORE BIOPSY: RIGHT BREAST  x1      STEREOTACTIC BREAST BIOPSY: RIGHT BREAST  x1 possibly       Recommedations :                      see [facility] for dictated radiology report   Reviewed pertinent patient history, family history of cancer, and patient medications.     -All herbal supplements, Vitamin E, Fish Oil    -All NSAIDs (Ibuprofen, Motrin, Advil, Aleve, or other antiinflammatory medication)  and Aspirin  81mg currently being taken    not  recommended or prescribed by  your physician  should be held for 5  days prior to biopsy.  Denies usage   -Aspirin 81 mg being taken related to a cardiac condition  or prescribed by your  physician should be held at the  direction of your physician.  Informed patient to call ordering physician for guidelines denies usage   -Blood thinners/antiplatelet medications (Coumadin, Plavix ect) should be held at the  direction of your physician.  Informed patient to call ordering physician for guidelines denies usage  Reviewed US guided biopsy procedure, as below.  You will be lying on your back, potentially slightly toward one side, for this procedure.  The US technician will use the ultrasound machine to locate the area in question that was seen on your previous breast imaging.  The Radiologist will then inject a local numbing medication into the  area. This may burn and sting for several seconds .  Then use a needle to collect cells or tissue from the site.  A marker, or clip, will then be placed in the biopsied area.  This marker is placed so this biopsy site is able to be accurately located upon future breast imaging.  After the clip is placed, steri strips will be applied to  the biopsy site and should be kept on for 5 days.  Additional mammography films will then be taken to assure correct placement of the placed marker.    Educated the patient they will be awake during this procedure and are able to drive themselves home if they wish.  Educated patient that they should eat breakfast and park in green lot and check in with [facility] .  Educated patient that some soreness  may occur after biopsy.  Discussed use of a supportive bra and ice packs after procedure, to decrease soreness.  Tylenol only for discomfort unless they have an allergy to tylenol .  Discussed with patient no swimming, bathing,  hot tubs or submerging underwater  for 5 days post procedure until the incision is closed and healed.   Educated patient on lifting restrictions - nothing heavier than a gallon of milk for 24-48 hours after the procedure.      Discussed with patient that some soreness and bruising is normal after biopsy but that prolonged or increased pain and bruising should be reported to the ordering physician.   Educated patient that they should bring a sports bra or form fitting bra on day of procedure. Educated patient that this helps with comfort after the biopsy and decrease swelling.  Reviewed results process with patient and shared that pathology results will be available within 2-3 business days of their biopsy.  Discussed results will be communicated by their ordering physician unless otherwise indicated.  Educated patient that once we receive an order from her physician  our radiology secretaries would be calling   to schedule the biopsy.

## 2025-01-07 NOTE — DISCHARGE INSTRUCTIONS
The Doctor (Radiologist) who performed your procedure was:     Place an ice pack over the biopsy site on top of your bra or on top of the ACE wrap (never apply ice directly over skin) for 10-15 minutes of every hour until bedtime for your comfort and to decrease bleeding.  Keep your sports bra or the ACE wrap (stereotactic and MRI biopsy) in place for 24 hours after your biopsy. This compression decreases bleeding and breast movement for your comfort. Wear a supportive bra for the next couple of days for comfort (sports bra for sleep).   Continue to wear, preferably, a sports bra or good supportive bra for 1 week and take off only to shower.  No baths or showers during the first 24 hours after biopsy. After this time you may take a shower. It's okay if the strips get wet but do not soak them. NO saunas, hot tubs or swimming until steri-strips fall off (approx. 5 days). This prevents infection and allows time for them to completely close and heal.  DO NOT remove the steri-strips. They will fall off in 5 days. If any type of irritation (redness, itching or blisters) develops in the area around the steri-strips, remove them gently. If the steri-strips do not fall off after 5 days, gently remove them. Keep the area clean and dry.  It is normal to have mild discomfort and bruising at the biopsy site.  You may take Tylenol as needed for discomfort, as long as you have no allergies to Tylenol. Do not take aspirin, motrin, ibuprofen or any medication containing NSAID (non-steroidal anti-inflammatory drug) product for 48 hours.  DO NOT participate in strenuous activity (aerobics, heavy lifting, housework, gardening, etc.) 48 hours after your biopsy to prevent bleeding.  You will receive results in 2-3 business days.  If you are having an MRI breast biopsy or an Ultrasound guided breast biopsy, you will be billed for the biopsy and unilateral mammogram separately.  If you have any questions about the procedure or your  results, please contact the Breast Care Coordinator Nurse at [phone].  Notify your ordering physician or primary physician for increased bleeding, pain or fever over 100. Or contact a Radiology Nurse at [phone] between 8am-4pm (after 4pm, your call will be directed to the [place] [facility]).

## 2025-01-14 ENCOUNTER — HOSPITAL ENCOUNTER (OUTPATIENT)
Dept: ULTRASOUND IMAGING | Facility: HOSPITAL | Age: 65
Discharge: HOME | End: 2025-01-14
Attending: INTERNAL MEDICINE
Payer: COMMERCIAL

## 2025-01-14 ENCOUNTER — HOSPITAL ENCOUNTER (OUTPATIENT)
Dept: MAMMOGRAPHY | Facility: HOSPITAL | Age: 65
Discharge: HOME | End: 2025-01-14
Attending: INTERNAL MEDICINE
Payer: COMMERCIAL

## 2025-01-14 DIAGNOSIS — N63.10 BREAST MASS, RIGHT: ICD-10-CM

## 2025-01-14 DIAGNOSIS — R92.8 ABNORMAL MAMMOGRAM: ICD-10-CM

## 2025-01-14 PROCEDURE — 88341 IMHCHEM/IMCYTCHM EA ADD ANTB: CPT | Performed by: INTERNAL MEDICINE

## 2025-01-14 PROCEDURE — 19083 BX BREAST 1ST LESION US IMAG: CPT | Performed by: INTERNAL MEDICINE

## 2025-01-14 PROCEDURE — 88305 TISSUE EXAM BY PATHOLOGIST: CPT | Performed by: INTERNAL MEDICINE

## 2025-01-14 PROCEDURE — 77065 DX MAMMO INCL CAD UNI: CPT | Performed by: INTERNAL MEDICINE

## 2025-01-14 PROCEDURE — 88342 IMHCHEM/IMCYTCHM 1ST ANTB: CPT | Performed by: INTERNAL MEDICINE

## 2025-01-14 NOTE — IMAGING NOTE
History taken and as follows:  Recommend post ultrasound mammogram to assess if this finding correlates to the mammographic focal asymmetry.  If the clip does not correspond to the   mammographic focal asymmetry, then single site right breast stereotactic guided biopsy recommended for a central mid depth focal asymmetry.     *** Dr COBOS  here to explain risk and benefits of procedure. Questions answered by the physcian    *** Pt consented at  SITE MARKED RIGHT   Breast    Placed in chair  at  *** scouts taken    ***Time out taken    ***Chloro prep as skin prep.   Lidocaine 1% 10  Milligrams per ml 5 ml given for superficial anesthetic affect at ***     ***Lidocaine  1% with epinephrine  1:100,000 units for deeper anesthetic affect 15 ML given.  More images taken after local  was given.    Sampling begins at ***    Sampling complete at  *** Core tainer taken to be imaged.    *** Formalin added to samples.    ***   *** clip inserted . Procedure completed . Pressure to site manually by nurse  and by machine compression for 10 minutes .    No active bleeding noted.  Area cleaned steri strips to site . Ice pack to site .     ***Cores taken to pathology by ***    *** post clip images  completed  Dressing dry and intact . Nipple markers removed by*** Bra applied per patient. 6\" ace secured over bra by nurse.      ***Post instructions  Given verbal et written AVS  summary sheet provided to patient. Patient verbalizes understanding and agreement.    ***Pt discharged

## 2025-01-14 NOTE — PROCEDURES
[facility]  part of [facility]  Procedure Note    Lora Lopez Patient Status:  Outpatient    1960 MRN [id number]   Location [facility] Attending Mimi Ervin MD   Hosp Day # 0 PCP Mimi Ervin MD     Procedure: Ultrasound guided right breast biopsy    Pre-Procedure Diagnosis:  Right breast mass    Post-Procedure Diagnosis: Same    Anesthesia:  Local    Findings:  Right breast mass at 1:00, 2 cm from nipple    Specimens: 14 gauge core biopsy x 3    Blood Loss:  None    Tourniquet Time: None  Complications:  None  Drains:  None    Secondary Diagnosis:  None    Kain Barker MD  2025

## 2025-01-14 NOTE — IMAGING NOTE
Pt arrived to room #4 .   scans taken by BRENDAN  ultrasound technologist    0658  Hx taken and is as follows: CONCLUSION:        Single site right breast ultrasound-guided biopsy recommended for a 1:00 2 cm from the nipple mass.      0700  Consent verified and obtained     0713  Imaging Completed by Dr COBOS    0714 Time out taken     0715  Skin prep with chloro prep sterile towels to site. Site marked RIGHT BREAST     0717 Lidocaine  1% 10 milligrams per ml given from kit  total amount  3 ml given.    0717 Lidocaine 1% with epinephrine  1:100,000 units 200 milligrams per  20 ml given total amount 5 ml given.    14  Gauge Bard biopsy device   to be used for core samples     Core # 1 at 0720 all cores to be placed in sterile cup with 10 ml ns     Total amount cores taken 3 placed in formalin at 0723     0723     Q Clip placed      0724  Procedure completed. Pressure to site . No active bleeding noted.  Area cleaned steri strips to site. Ice pack to site .     0725 Post instructions given verbal et written. Avs summary sheet provided to patient. Patient verbalizes understanding and agreement .    0726  Specimen taken to pathology by BRENDAN [employer] TECH     0733 To mammography department  for post clip images . Report to CARL  Mammography technologist. Mammography department to discharge patient after images completed.

## 2025-01-16 ENCOUNTER — TELEPHONE (OUTPATIENT)
Dept: ULTRASOUND IMAGING | Facility: HOSPITAL | Age: 65
End: 2025-01-16

## 2025-01-16 NOTE — TELEPHONE ENCOUNTER
Lora Lopez is s/p biopsy .  Phoned and introduced myself as breast coordinator .  No answer.  I left a detail message to return call to   to review results and recommendations.  Awaiting call back. I did contact Dr Ervin and Dr Ervin wants pt to follow up with Dr Walls as the breast surgeon for the following pathology    Pathology demonstrates a papillary lesion with atypia and is concordant with imaging.       RECOMMENDATION:   Surgical consultation is recommended.

## 2025-01-16 NOTE — TELEPHONE ENCOUNTER
Lora Lopez is s/p biopsy .  Phoned and introduced myself as breast coordinator .  Reinforced to patient  post biopsy care and instructions . NO c/o post Bx. Pt was instructed to follow up with Dr Reanna Walls [phone] for consult for excision .  A staff message was sent to Dr Oscar'  team to assist pt in getting an appt .    Informed  and shared the pathology results as well as the recommendations from Dr Barker for her breast imaging  as follows:      Pathology results shared (see epic for dictated pathology and radiology procedure report)  and recommendations are as follows:       ADDENDUM:   Addendum to the recommendations as follows:     Surgical consultation for excision of the right breast papillary lesion with atypia is recommended.           Dictated by (CST): Kain Barker MD on 1/16/2025 at 2:35 PM       Finalized by (CST): Kain Barker MD on 1/16/2025 at 2:36 PM                              Lora Lopez acknowledges the above and denies questions. Lora Lopez was also instructed to perform breast self exams and if any changes  develops any changes to contact ordering  physician immediately  for re evaluation..  Lora Lopez  verbalizes understanding and agreement.

## 2025-01-23 DIAGNOSIS — N60.99 INTRADUCTAL PAPILLOMA WITH ATYPICAL DUCTAL HYPERPLASIA OF BREAST: Primary | ICD-10-CM

## 2025-01-23 DIAGNOSIS — D24.9 INTRADUCTAL PAPILLOMA WITH ATYPICAL DUCTAL HYPERPLASIA OF BREAST: Primary | ICD-10-CM

## 2025-03-12 ENCOUNTER — OFFICE VISIT (OUTPATIENT)
Dept: INTERNAL MEDICINE CLINIC | Facility: CLINIC | Age: 65
End: 2025-03-12

## 2025-03-12 VITALS
DIASTOLIC BLOOD PRESSURE: 74 MMHG | WEIGHT: 155.63 LBS | BODY MASS INDEX: 27.57 KG/M2 | OXYGEN SATURATION: 99 % | HEART RATE: 84 BPM | HEIGHT: 63 IN | SYSTOLIC BLOOD PRESSURE: 113 MMHG | TEMPERATURE: 97 F

## 2025-03-12 DIAGNOSIS — M06.9 RHEUMATOID ARTHRITIS, INVOLVING UNSPECIFIED SITE, UNSPECIFIED WHETHER RHEUMATOID FACTOR PRESENT (HCC): ICD-10-CM

## 2025-03-12 DIAGNOSIS — R92.8 ABNORMAL MAMMOGRAM: ICD-10-CM

## 2025-03-12 DIAGNOSIS — Z12.11 COLON CANCER SCREENING: ICD-10-CM

## 2025-03-12 DIAGNOSIS — Z00.00 PHYSICAL EXAM, ANNUAL: Primary | ICD-10-CM

## 2025-03-12 DIAGNOSIS — Z79.899 LONG-TERM USE OF PLAQUENIL: ICD-10-CM

## 2025-03-12 DIAGNOSIS — T75.3XXA MOTION SICKNESS, INITIAL ENCOUNTER: ICD-10-CM

## 2025-03-12 PROCEDURE — 99396 PREV VISIT EST AGE 40-64: CPT | Performed by: INTERNAL MEDICINE

## 2025-03-12 RX ORDER — SCOPOLAMINE 1 MG/3D
1 PATCH, EXTENDED RELEASE TRANSDERMAL
Qty: 2 PATCH | Refills: 0 | Status: SHIPPED | OUTPATIENT
Start: 2025-03-12

## 2025-03-12 NOTE — PROGRESS NOTES
Lora Lopez is a 64 year old female.  Chief Complaint   Patient presents with    Physical    Medication Request     Requesting motion sickness medication for cruise in May        HPI:   Patient comes for her annual physical  C/C annual physical  C/o will be going on a cruise in May needs medications for motion sickness--  Will be gone for 5 days -going to the [facility]       HISTORY  New patient- from [facility]   C/C establish care  C/o overall doing well   Chart reviewed      [facility]  Lupus -  Hypothyroidism               Retired -used to work at [employer] now volunteering and helping care of granddaughter   Lives with         Current Outpatient Medications   Medication Sig Dispense Refill    Scopolamine 1.5mg TD patch 1mg/3days Place 1 patch onto the skin every third day. 2 patch 0    XYZAL ALLERGY 24HR OR Take by mouth.      levothyroxine 75 MCG Oral Tab Take 1 tablet (75 mcg total) by mouth every morning. 90 tablet 3    hydroxychloroquine 200 MG Oral Tab Take 1 tablet (200 mg total) by mouth daily. 90 tablet 3    Cholecalciferol (VITAMIN D3) 25 MCG (1000 UT) Oral Cap       Cyanocobalamin (VITAMIN B 12) 500 MCG Oral Tab       Ascorbic Acid 500 MG Oral Cap Orally (Patient not taking: Reported on 3/12/2025)        Past Medical History:    Allergic rhinitis    Anemia    Arthritis    Hypothyroidism    Lupus    Thyroid disease      Past Surgical History:   Procedure Laterality Date    Colonoscopy      Needle biopsy left      x's 3    Needle biopsy right  2025          Other surgical history Left     breast biospy      Social History:  Social History     Socioeconomic History    Marital status:    Tobacco Use    Smoking status: Never    Smokeless tobacco: Never   Vaping Use    Vaping status: Never Used   Substance and Sexual Activity    Alcohol use: Not Currently     Alcohol/week: 1.0 standard drink of alcohol     Types: 1 Glasses of wine per week     Comment: occ    Drug use:  Never    Sexual activity: Not Currently   Other Topics Concern    Blood Transfusions No     Social Drivers of Health      [facility]    Housing Stability        REVIEW OF SYSTEMS:   GENERAL HEALTH: No fevers, chills, sweats, fatigue  VISION: No recent vision problems, blurry vision or double vision  HEENT: No decreased hearing ear pain nasal congestion or sore throat  SKIN: denies any unusual skin lesions or rashes  RESPIRATORY: denies shortness of breath, cough, wheezing  CARDIOVASCULAR: denies chest pain on exertion, palpitations, swelling in feet  GI: denies abdominal pain and denies heartburn, nausea or vomiting  : No Pain on urination, change in the color of urine, discharge, urinating frequently  MUS: No back pain, joint pain, muscle pain  NEURO: denies headaches , anxiety, depression    EXAM:   /74   Pulse 84   Temp 97 °F (36.1 °C)   Ht 5' 3\" (1.6 m)   Wt 155 lb 9.6 oz (70.6 kg)   SpO2 99%   BMI 27.56 kg/m²   GENERAL: well developed, well nourished,in no apparent distress  SKIN: no rashes,no suspicious lesions  HEENT: atraumatic, normocephalic,ears and throat are clear, no frontal or maxillary sinus tenderness, pupils equal reactive to light bilaterally, extraocular muscles intact   NECK: supple,no adenopathy,nontender   LUNGS: clear to auscultation, no wheeze  CARDIO: RRR   GI: good BS's,no masses or tenderness  EXTREMITIES: no cyanosis, or edema    ASSESSMENT AND PLAN:   Diagnoses and all orders for this visit:    Physical exam, annual  advised patient to watch what he eats exercise, seatbelt use no texting driving, sunscreen use advised    Abnormal mammogram  -     OP REFERRAL TO SURGICAL ONCOLOGY  Will place referral for the breast specialist    Colon cancer screening  -     GASTRO - INTERNAL  -     [facility] Telephone Colon Screen  Refer  Rheumatoid arthritis, involving unspecified site, unspecified whether rheumatoid factor  present (HCC)  -     RHEUMATOLOGY - INTERNAL  And   Long-term use of Plaquenil  -     Ophthalmology Referral - In Network  Will refer to ophthalmology as she is on Plaquenil and needs regular checkups and needs an eye doctor      Motion sickness, initial encounter  -     Scopolamine 1.5mg TD patch 1mg/3days; Place 1 patch onto the skin every third day.  Apply 1 patch (1 mg/3 days) behind ear at least 4 hours prior to required antiemetic effect for use up to 72 hours          Preventative medicine  Labs from 10/2024  reviewed  Mammogram  1/2025 -placed referral for dr alcala   Pap smear 2019   Colonoscopy will refer       The patient indicates understanding of these issues and agrees to the plan.  No follow-ups on file.

## 2025-03-13 ENCOUNTER — TELEPHONE (OUTPATIENT)
Facility: CLINIC | Age: 65
End: 2025-03-13

## 2025-03-13 NOTE — TELEPHONE ENCOUNTER
Spoke with patient and verified date of birth.     Reviewed indication to schedule telephone colon screening appointment.     Confirmed date, time and details for phone screening. Verified best contact number, 2-part GI questionnaire, and no active GI symptoms.     Verbalized understanding and appreciative for call.     Future Appointments   Date Time Provider Department Center   3/24/2025  3:30 PM GI COLON SCREENING [id number] None

## 2025-03-24 ENCOUNTER — NURSE ONLY (OUTPATIENT)
Facility: CLINIC | Age: 65
End: 2025-03-24

## 2025-03-24 DIAGNOSIS — Z12.11 SCREENING FOR COLON CANCER: Primary | ICD-10-CM

## 2025-03-24 NOTE — PROGRESS NOTES
Called patient for scheduled telephone colon screen.   Please advise on colonoscopy and bowel prep orders.   Medications, pharmacy, and allergies reviewed.     Age 45-76 y/o: Yes  › MD preference: None   › Insurance:  [employer]  › Last PCP visit: 3/12/2025  › Last CBC: 10/31/2024  › Date of positive FIT (if applicable): N/A  › H/W/BMI: 5'3/ 155.0 Lbs/ 27.57 BMI     Special comments/notes: Patient completed Colonoscopy in 2020 or 2022 no report on file.  Telephone Colon Screening Questionnaire Yes No   Are you currently experiencing any GI symptoms [] [x]   If yes, explain:     Rectal bleeding [] [x]   Black stool [] [x]   Dysphagia or food \"feeling stuck\" when eating [] [x]   Intractable vomiting [] [x]   Unexplained weight loss [] [x]   First colonoscopy [] [x]   Family history of colon cancer [] [x]   Any issues with anesthesia [] [x]   If yes, explain:      Any recent complaints related to chest pain &/or shortness of breath [] [x]   Referred to a cardiologist?  [] [x]   If yes, explain:      History of  respiratory issues/oxygen/SHIRA/COPD [] [x]   CPAP/BiPAP:     History of devices (pacemaker/defibrillator) [] [x]   History of heart attack &/or stroke [] [x]   If yes, in the last 12 months? Stent placement?  [] [x]     Medication Reconciliation  Yes  No   Anticoagulants (except Aspirin) [] [x]   Diabetic Medication [] [x]   Weight loss medication (phentermine/vyvanse/saxsenda/etc) [] [x]   Iron/herbal/multivitamin supplement(s) Vitamin B12 [x] []   Usage of marijuana, CBD &/or vape product(s) [] [x]

## 2025-03-24 NOTE — PROGRESS NOTES
GI Staff:  [facility] Colon Screening Orders    Please schedule: Colonoscopy 61118 with MAC OR IV (if appropriate)    Diagnosis: Colon Screening Z12.11     Medication adjustments:Hold vitamins 7 days prior to procedure.  Day before procedure, hold: None  Day of procedure, hold:  None    >>>Please inform patient if new medications are started after scheduling procedure they need to call clinic to notify us.

## 2025-03-24 NOTE — PROGRESS NOTES
Dr. Odom,    Patient Is requesting that she use Miralax/ Gatorade prep for her scheduled Colonoscopy with you. She was able to tolerate that prep during her last Colonoscopy. Please advise on the alternative.    Thanks!

## 2025-03-24 NOTE — PROGRESS NOTES
Scheduled for:  Colonoscopy 98348  Provider Name:    Date:  7/8/2025  Location:  [facility]  Sedation:  MAC  Time:  (pt is aware that ENDO will call the day before to confirm arrival time)  Prep:    Meds/Allergies Reconciled?:  Physician reviewed  Diagnosis with codes:  Screening for Colon Cancer Z12.11  Was patient informed to call insurance with codes (Y/N):  Yes  Referral sent?:  Referral was sent at the time of electronic surgical scheduling.  EM or [facility] notified?:   I sent an electronic request to Endo Scheduling and received a confirmation today.  Medication Orders:  Patient is aware to NOT take iron pills, herbal meds and diet supplements for 7 days before exam. Also to NOT take any form of alcohol, recreational drugs and any forms of ED meds 24-72 hours before exam.   Misc Orders:  N/A     Further instructions given by staff:  I discussed the prep instructions with the patient which she verbally understood and is aware that I will send prep instructions today via [employer].

## 2025-05-20 RX ORDER — LEVOTHYROXINE SODIUM 75 UG/1
75 TABLET ORAL EVERY MORNING
Qty: 90 TABLET | Refills: 3 | Status: SHIPPED | OUTPATIENT
Start: 2025-05-20

## 2025-06-04 ENCOUNTER — OFFICE VISIT (OUTPATIENT)
Facility: CLINIC | Age: 65
End: 2025-06-04
Payer: COMMERCIAL

## 2025-06-04 VITALS
DIASTOLIC BLOOD PRESSURE: 73 MMHG | SYSTOLIC BLOOD PRESSURE: 124 MMHG | BODY MASS INDEX: 27 KG/M2 | OXYGEN SATURATION: 98 % | WEIGHT: 152 LBS | HEART RATE: 71 BPM

## 2025-06-04 DIAGNOSIS — N60.91 ATYPICAL HYPERPLASIA OF RIGHT BREAST: Primary | ICD-10-CM

## 2025-06-04 PROCEDURE — 99204 OFFICE O/P NEW MOD 45 MIN: CPT | Performed by: SURGERY

## 2025-06-04 NOTE — PROGRESS NOTES
Breast Surgery New Patient Consultation    This is the first visit for this 64 year old woman, referred by Dr. Ervin, who presents for evaluation of atypical papillary lesion of right breast.    History of Present Illness:   Ms. Lora Lopez is a 64 year old woman who presents with an imaging detected concern of the right breast.  The patient denies any palpable masses, nipple discharge, skin changes or axillary symptoms.  She does have a family history of breast cancer.  She has a remote history of multiple benign biopsies from her left breast.  She presented for a screening mammogram on December 10, 2024 and was found to have a focal asymmetry in the inner central right breast with stable findings on the left.  She had a right breast diagnostic evaluation in 2025 that confirmed a 4 mm mass with irregular margins at 1:00, 2 cm from the nipple for which biopsy was recommended.  This took place on 2025 and confirmed fragments of a papillary lesion with focal atypia.  She is here today for evaluation and recommendations for further therapy.        Past Medical History[1]    Past Surgical History[2]    Gynecological History:  Pt is a   Pt was 28 years old at time of first pregnancy.    She has cumulative breastfeeding history of 48 months.  She achieved menarche at age 12 and LMP 2018  Age of Menopause: 57  Type: natural   She denies any history of hormone replacement therapy .  She has history of oral contraceptive use for 4 years, last in 2019.  She denies infertility treatment to achieve pregnancy.    Medications:    Current Medications[3]    Allergies:    Allergies[4]    Family History:   Family History[5]    She is not of Ashkenazi Episcopal ancestry.    Social History:  History   Alcohol Use Not Currently     Comment: occ       History   Smoking Status    Never   Smokeless Tobacco    Never     Ms. Lora Lopez is  with 3 children. She has 5 siblings. She is currently  Retired    Review of Systems:  General:   The patient denies, fever, chills, night sweats, fatigue, generalized weakness, change in appetite or weight loss.    HEENT:     The patient denies eye irritation, cataracts, redness, glaucoma, yellowing of the eyes, change in vision, color blindness, or +wearing contacts/glasses. The patient denies hearing loss, ringing in the ears, ear drainage, earaches, nasal congestion, nose bleeds, snoring, pain in mouth/throat, hoarseness, change in voice, facial trauma.    Respiratory:  The patient denies chronic cough, phlegm, hemoptysis, pleurisy/chest pain, pneumonia, asthma, wheezing, difficulty in breathing with exertion, emphysema, chronic bronchitis, shortness of breath or abnormal sound when breathing.     Cardiovascular:  There is no history of chest pain, chest pressure/discomfort, palpitations, irregular heartbeat, fainting or near-fainting, difficulty breathing when lying flat, SOB/Coughing at night, swelling of the legs or chest pain while walking.    Breasts:  See history of present illness    Gastrointestinal:     There is no history of difficulty or pain with swallowing, reflux symptoms, vomiting, dark or bloody stools, constipation, yellowing of the skin, indigestion, nausea, change in bowel habits, diarrhea, abdominal pain or vomiting blood.     Genitourinary:  The patient denies frequent urination, +needing to get up at night to urinate, urinary hesitancy or retaining urine, painful urination, urinary incontinence, decreased urine stream, blood in the urine or vaginal/penile discharge.    Skin:    The patient denies rash, itching, skin lesions, dry skin, change in skin color or change in moles.     Hematologic/Lymphatic:  The patient denies easily bruising or bleeding or persistent swollen glands or lymph nodes.     Musculoskeletal:  The patient denies muscle aches/pain, +joint pain, +stiff joints, neck pain, back pain or bone pain.    Neuropsychiatric:  There is  no history of migraines or severe headaches, seizure/epilepsy, speech problems, coordination problems, trembling/tremors, fainting/black outs, dizziness, memory problems, loss of sensation/numbness, problems walking, weakness, tingling or burning in hands/feet. There is no history of abusive relationship, bipolar disorder, sleep disturbance, anxiety, depression or feeling of despair.    Endocrine:    There is no history of poor/slow wound healing, weight loss/gain, fertility or hormone problems, cold intolerance, +thyroid disease.     Allergic/Immunologic:  There is no history of hives, hay fever, angioedema or anaphylaxis.    /73 (BP Location: Right arm, Patient Position: Sitting, Cuff Size: adult)   Pulse 71   Wt 68.9 kg (152 lb)   SpO2 98%   BMI 26.93 kg/m²     Physical Exam:  The patient is an alert, oriented, well-nourished and  well-developed woman who appears her stated age. Her speech patterns and movements are normal. Her affect is appropriate.    HEENT: The head is normocephalic. The neck is supple. The thyroid is not enlarged and is without palpable masses/nodules. There are no palpable masses. The trachea is in the midline. Conjunctiva are clear, non-icteric.    Chest: The chest expands symmetrically. The lungs are clear to auscultation.    Heart: The rhythm is regular.  There are no murmurs, rubs, gallops or thrills.    Breasts:  Her breasts are symmetrical with a cup size 36B.  Right breast: The skin, nipple ,and areola appear normal. There is no skin dimpling with movement of the pectoralis. There is no nipple retraction. No nipple discharge can be elicited. The parenchyma is mildly nodular. There are no dominant masses in the breast. The axillary tail is normal.  Left breast:   The skin, nipple, and areola appear normal. There is no skin dimpling with movement of the pectoralis. There is no nipple retraction. No nipple discharge can be elicited. The parenchyma is mildly nodular. There are  no dominant masses in the breast. The axillary tail is normal.    Abdomen:  The abdomen is soft, flat and non tender. The liver is not enlarged. There are no palpable masses.    Lymph Nodes:  The supraclavicular, axillary and cervical regions are free of significant lymphadenopathy.    Back: There is no vertebral column tenderness.    Skin: The skin appears normal. There are no suspicious appearing rashes or lesions.    Extremities: The extremities are without deformity, cyanosis or edema.    Impression:   Ms. Lora Lopez is a 64 year old woman presents with family history of breast cancer and right breast atypical papillary lesion.    Discussion and Plan:  I had a discussion with the Patient regarding her breast exam. On exam today I found her to be healing well since recent biopsy with no other clinical findings.  I personally reviewed the recent imaging and pathology and we discussed this at length.  We discussed the significance and vocations of the papillary lesion associated with the focal atypia.  We discussed possible residual atypia and/or intraductal malignancy.  We discussed there appears localized surgical excision to exclude a residual concerning pathology in that location.  Patient reports acute insurance issues and does not wish to proceed with surgical intervention.  She states she may be motivated to have this excised once she is on Medicare next year.  We discussed possible delay in surgery monitor leading to more invasive procedure and her other diagnosis and I have therefore recommended that we plan for a bilateral diagnostic surveillance in December 2025 prior to consideration of any surgical excision.  I have advised that she see me after the imaging is repeated to finalize a surgical plan.  The risks and possible complications of the procedure were explained to the patient and her family and she understood and agreed to the proposed plan. She was given ample opportunity for questions and  those questions were answered to her satisfaction. She has been  encouraged to contact the office with any questions or concerns prior to her next appointment.     This note was created by [employer] voice recognition. Errors in content may be related to improper recognition by the system; efforts to review and correct have been done but errors may still exist. Please be advised the primary purpose of this note is for me to communicate medical care. Standard sentence structure is not always used. Medical terminology and medical abbreviations may be used. There may be grammatical, typographical, and automated fill ins that may have errors missed in proofreading.             [1]   Past Medical History:   Allergic rhinitis    Anemia    Arthritis    Hypothyroidism    Lupus    Thyroid disease   [2]   Past Surgical History:  Procedure Laterality Date    Colonoscopy      Needle biopsy left      x's 3    Needle biopsy right  2025          Other surgical history Left     breast biospy   [3]   No outpatient medications have been marked as taking for the 25 encounter (Appointment) with Reanna Walls MD.   [4]   Allergies  Allergen Reactions    Bupropion ITCHING, ANGIOEDEMA, UNKNOWN and NAUSEA AND VOMITING    Pineapple OTHER (SEE COMMENTS)     Itchy tongue    [5]   Family History  Problem Relation Age of Onset    Breast Cancer Mother 62    Cancer Mother         breast    Cancer Father         liver    Glaucoma Neg     Macular degeneration Neg     Diabetes Neg

## 2025-06-05 PROBLEM — N60.91 ATYPICAL HYPERPLASIA OF RIGHT BREAST: Status: ACTIVE | Noted: 2025-06-05

## 2025-06-16 ENCOUNTER — TELEPHONE (OUTPATIENT)
Dept: CASE MANAGEMENT | Age: 65
End: 2025-06-16

## 2025-06-16 DIAGNOSIS — Z12.11 SCREENING FOR COLON CANCER: Primary | ICD-10-CM

## 2025-06-16 NOTE — TELEPHONE ENCOUNTER
Managed Care:   Was there any info given as to why it was denied or was a denial letter sent? Pt was referred to GI for colon cancer screening by PCP, we have never seen her in office.

## 2025-06-16 NOTE — TELEPHONE ENCOUNTER
PPD: Was there any info given as to why it was denied or was a denial letter sent? Pt was referred to GI for colon cancer screening by PCP, we have never seen her in office.

## 2025-06-16 NOTE — TELEPHONE ENCOUNTER
Good afternoon,     [facility]/ DOS 7/8/25 at [facility]     Tracking# [id number]    Received call from Neetu with [facility]    -case has been denied    -Peer to Peer  is available at   phone# [phone]    Please advise when P2P is scheduled.    Thank you    Kristan   [facility]

## 2025-06-18 NOTE — TELEPHONE ENCOUNTER
Left message to call back.    It was likely denied because she had a past colonoscopy and no records are available anywhere in the chart and primary care note does not mention when her last procedure took place or what was found.    Dr. Odom is not able to do a peer to peer on a telephone colon screening encounter in which he has never seen the patient.

## 2025-06-19 NOTE — TELEPHONE ENCOUNTER
I spoke to the patient  I let her know that the colonoscopy was denied by her plan  I let her know likely because they need to know why she needs sooner than 10 year recall colonoscopy.  I asked her if she had records from her prior procedure which she does not have.  She wants to reschedule to a later date so she has time to obtain records from [facility]  She was given our office fax so she could request records be sent to us.    Schedulers:  Can you please assist patient with rescheduling colonoscopy to a later date to allow her time to obtain medical records and attempt to resubmit prior approval with these?  We can't do a peer to peer because she was a [facility] appointment and never seen in office.    Thank you    Orders in 3/24/2025 Gi Colon Screen encounter.

## 2025-06-19 NOTE — TELEPHONE ENCOUNTER
Called patient - left voicemail to call office back to discuss rescheduling options for her canceled colonoscopy.    Please refer to telephone encounter dated 3/24/2025 for scheduling orders.    Please transfer call to GI schedulers.

## 2025-06-26 NOTE — TELEPHONE ENCOUNTER
Called patient - states she is going to check her records to find out when she last had a colonoscopy and will call the office back at a later time to reschedule.

## 2025-08-07 ENCOUNTER — TELEPHONE (OUTPATIENT)
Facility: CLINIC | Age: 65
End: 2025-08-07

## 2025-08-15 ENCOUNTER — TELEPHONE (OUTPATIENT)
Facility: CLINIC | Age: 65
End: 2025-08-15

## (undated) NOTE — LETTER
Date & Time: 2/18/2019, 6:34 PM  Patient: Kory Teague  Encounter Provider(s):    MONCHO Rogers       To Whom It May Concern:    Kory Teague was seen and treated in our department on 2/18/2019. She should not return to work until 2/21/2019.     If y

## (undated) NOTE — ED AVS SNAPSHOT
Jamie Palma   MRN: [id number]    Department:  [facility] Emergency Department   Date of Visit:  5/10/2018           Disclosure     Insurance plans vary and the physician(s) referred by the ER may not be covered by your plan.  Please contact your CARE PHYSICIAN AT ONCE OR RETURN IMMEDIATELY TO THE EMERGENCY DEPARTMENT. If you have been prescribed any medication(s), please fill your prescription right away and begin taking the medication(s) as directed.   If you believe that any of the medications

## (undated) NOTE — LETTER
[facility] ULTRASOUND [facility] FOR [facility]  [address] [address]  Authorization for Imaging Procedure    I hereby authorize                                      , my physician and his/her assistants (if applicable), which may include medical students, residents, and/or fellows, to perform the following procedure and administer such anesthesia as may be determined necessary by my physician: ULTRASOUND GUIDED RIGHT BREAST BIOPSY WITH CLIP PLACEMENT AND POSSIBLE STEREOTACTIC GUIDED BIOPSY WITH TOMOSYNTHESIS OF RIGHT BREAST WITH CLIP PLACEMENT on Lora Lopez.   2.  I recognize that during the procedure, unforeseen conditions may necessitate additional or different procedures than those listed above. I, therefore, further authorize and request that the above-named physician, assistants, or designees perform such procedures as are, in their judgment, necessary and desirable.    3.  My physician has discussed prior to my procedure the potential benefits, risks and side effects of this procedure; the likelihood of achieving goals; and potential problems that might occur during recuperation. They also discussed reasonable alternatives to the procedure, including risks, benefits, and side effects related to the alternatives and risks related to not receiving this procedure. I have had all my questions answered and I acknowledge that no guarantee has been made as to the result that may be obtained.    4.  Should the need arise during my procedure, which includes change of level of care prior to discharge, I also consent to the administration of blood and/or blood products. Further, I understand that despite careful testing and screening of blood or blood products by collecting agencies, I may still be subject to ill effects as a result of receiving a blood transfusion and/or blood products. The following are some, but not all, of the potential risks that can occur: fever and allergic reactions,  hemolytic reactions, transmission of diseases such as Hepatitis, AIDS and Cytomegalovirus (CMV) and fluid overload. In the event that I wish to have an autologous transfusion of my own blood, or a directed donor transfusion, I will discuss this with my physician.  Check only if Refusing Blood or Blood Products  I understand refusal of blood or blood products as deemed necessary by my physician may have serious consequences to my condition to include possible death. I hereby assume responsibility for my refusal and release the hospital, its personnel, and my physicians from any responsibility for the consequences of my refusal.   [  ] Patient Refuses Blood      5.  I authorize the use of any specimen, organs, tissues, body parts or foreign objects that may be removed from my body during the procedure for diagnosis, research or teaching purposes and their subsequent disposal by hospital authorities. I also authorize the release of specimen test results and/or written reports to my treating physician on the hospital medical staff or other referring or consulting physicians involved in my care, at the discretion of the Pathologist or my treating physician.    6.  I consent to the photographing or videotaping of the procedures to be performed, including appropriate portions of my body for medical, scientific, or educational purposes, provided my identity is not revealed by the pictures or by descriptive texts accompanying them. If the procedure has been photographed/videotaped, the physician will obtain the original picture, image, videotape or CD. The hospital will not be responsible for storage, release or maintenance of the picture, image, tape or CD.   7.  I consent to the presence of a  or observers in the operating room as deemed necessary by my physician or their designees.    8.  I recognize that in the event my procedure results in extended X-Ray/fluoroscopy time, I may develop a skin reaction.     9.  If I have a Do Not Attempt Resuscitation (DNAR) order in place, that status will be suspended while in the operating room, procedural suite, and during the recovery period unless otherwise explicitly stated by me (or a person authorized to consent on my behalf). The performing physician or my attending physician will determine when the applicable recovery period ends for purposes of reinstating the DNAR order.  10.  I acknowledge that my physician has explained sedation/analgesia administration to me including the risk and benefits I consent to the administration of sedation/analgesia as may be necessary or desirable in the judgment of my physician.      I CERTIFY THAT I HAVE READ AND FULLY UNDERSTAND THE ABOVE CONSENT FOR THE PROCEDURE.     Signature of Patient: _____________________________________________________________  Responsible person in case of minor, unconscious: ____________________________________  Relationship to patient:  __________________________________________________________  Signature of Witness: _______________________________Date: _________Time: __________    Statement of Physician: My signature below affirms that prior to the time of the procedure, I have explained to the patient and/or her guardian, the risks and benefits involved in the proposed treatment and any reasonable alternative to the proposed treatment. I have also explained the risks and benefits involved in the refusal of the proposed treatment and have answered the patient's questions. If I have a significant financial interest in a co-management agreement or a significant financial interest in any product or implant, or other significant relationship used in the procedure/surgery, I have disclosed this and had a discussion with my patient.  Signature of Physician:   _________________________________Date:_____________Time:________    Patient Name: Lora HOOPER Jessica : 1960  Printed: 2025   Medical Record #:  [id number]

## (undated) NOTE — ED AVS SNAPSHOT
Jess Hart   MRN: [id number]    Department:  [facility] Emergency Department   Date of Visit:  2/18/2019           Disclosure     Insurance plans vary and the physician(s) referred by the ER may not be covered by your plan.  Please contact your CARE PHYSICIAN AT ONCE OR RETURN IMMEDIATELY TO THE EMERGENCY DEPARTMENT. If you have been prescribed any medication(s), please fill your prescription right away and begin taking the medication(s) as directed.   If you believe that any of the medications

## (undated) NOTE — LETTER
2023      No Recipients     Patient: Calixto Deluca   YOB: 1960   Date of Visit: 10/11/2023       Dear Dr. Corrinne Maillard Recipients: Thank you for referring Calixto Deluca to me for evaluation. Here is my assessment and plan of care:    Calixto Deluca is a 58year old female. HPI:     HPI    NP. Pt in today for a Plaquenil eye exam. Pt has been taking Plaquenil for Lupus for about 25 years and follows up with Dr. Jesse Krueger every 6 months. Pt's last eye exam was about a year ago with Dr. Giovany Solis who she had been seeing for 25 years but insurance no longer covers her. Pt denies any surgeries done to the eyes but states that her vision has changed drastically over the past year. Pt states she is not able to see out of her most recent glasses but found out she sees better with an older pair of glasses she had. Consult: per Dr. Rachna Muñoz   Last edited by Patti Clifford OT on 10/11/2023  3:33 PM.        Patient History:  Past Medical History:   Diagnosis Date    Allergic rhinitis 1969    Anemia     Arthritis 1998    Hypothyroidism     Lupus ([facility] [address]     Thyroid disease        Surgical History: Calixto Deluca has a past surgical history that includes colonoscopy ();  (); and other surgical history (Left) (breast biospy). Family History   Problem Relation Age of Onset    Cancer Mother         breast    Cancer Father         liver    Glaucoma Neg     Macular degeneration Neg     Diabetes Neg        Social History:   Social History     Socioeconomic History    Marital status:    Tobacco Use    Smoking status: Never    Smokeless tobacco: Never   Vaping Use    Vaping Use: Never used   Substance and Sexual Activity    Alcohol use:  Yes     Alcohol/week: 1.0 standard drink of alcohol     Types: 1 Glasses of wine per week     Comment: occ    Drug use: Never       Medications:  Current Outpatient Medications   Medication Sig Dispense Refill    hydroxychloroquine 200 MG Oral Tab Take 1 tablet (200 mg total) by mouth daily. 90 tablet 1    levothyroxine 75 MCG Oral Tab Take 1 tablet (75 mcg total) by mouth every morning.  90 tablet 3    Cholecalciferol (VITAMIN D3) 25 MCG (1000 UT) Oral Cap       Cyanocobalamin (VITAMIN B 12) 500 MCG Oral Tab          Allergies:    Bupropion               ITCHING, ANGIOEDEMA, UNKNOWN,                            NAUSEA AND VOMITING  Pineapple               OTHER (SEE COMMENTS)    Comment:Itchy tongue    ROS:     ROS    Positive for: Eyes  Negative for: Constitutional, Gastrointestinal, Neurological, Skin, Genitourinary, Musculoskeletal, HENT, Endocrine, Cardiovascular, Respiratory, Psychiatric, Allergic/Imm, Heme/Lymph  Last edited by Agata Thomas OT on 10/11/2023  2:58 PM.          PHYSICAL EXAM:     Base Eye Exam       Visual Acuity (Snellen - Linear)         Right Left    Dist cc 20/60 -2 20/25    Dist ph cc 20/30 +2     Near cc 20/30+ 20/25-      Correction: Glasses              Visual Acuity #2 (Snellen - Linear)         Right Left    Dist cc 20/200 20/60 +2    Dist ph cc 20/50 -2 20/30 -2      Correction: Glasses              Visual Acuity Comments    DVA 1 checked with old glasses  DVA 2 checked with new glasses             Tonometry (Icare, 3:28 PM)         Right Left    Pressure 13 11              Pupils         Pupils    Right PERRL    Left PERRL              Visual Fields         Left Right     Full Full              Extraocular Movement         Right Left     Full, Ortho Full, Ortho              Dilation       Both eyes: 1.0% Mydriacyl and 2.5% Hugo Synephrine @ 3:28 PM              Dilation #2       Both eyes: 1.0% Mydriacyl and 2.5% Hugo Synephrine @ 3:31 PM                  Additional Tests       Amsler         Right Left     Normal Normal              Color         Right Left    Ish[place]ara 5/5 5/5                  Slit Lamp and Fundus Exam       Slit Lamp Exam         Right Left    Lids/Lashes Dermatochalasis, Meibomian gland dysfunction Dermatochalasis, Meibomian gland dysfunction    Conjunctiva/Sclera Normal Normal    Cornea Clear Clear    Anterior Chamber Deep and quiet Deep and quiet    Iris Normal Normal    Lens 1-2+ Nuclear sclerosis 1-2+ Nuclear sclerosis    Vitreous Clear Clear              Fundus Exam         Right Left    Disc Good rim Good rim    C/D Ratio 0.5 0.5    Macula Normal-no plaquenil toxicity Normal-no plaquenil toxicity    Vessels Normal Normal    Periphery Normal Normal                  Refraction       Wearing Rx         Sphere Cylinder Axis Add    Right +0.50 +0.50 075 +2.00    Left +0.50 +0.50 085 +2.00      Age: 4yrs    Type: Old Progressive bifocal              Wearing Rx #2         Sphere Cylinder Axis Add    Right +1.00 +0.75 070 +2.00    Left +1.50 +0.50 090 +2.00      Age: 2yrs    Type: New Progressive bifocal              Manifest Refraction (Auto)         Sphere Cylinder [place] Dist [place] Add Near [place]    Right -0.75 +0.75 075       Left +1.00 Sphere                  Manifest Refraction #2         Sphere Cylinder [place] Dist VA Add Near [place]    Right -1.00 +0.75 075 20/20- +2.25 20/20    Left +0.75 Sphere  20/20- +2.25 20/20              Final Rx         Sphere Cylinder [place] Dist VA Add Near [place]    Right -1.00 +0.75 075 20/20- +2.25 20/20    Left +0.75 Sphere  20/20- +2.25 20/20      Type: Progressive bifocal                     ASSESSMENT/PLAN:     Diagnoses and Plan:     Long-term use of Plaquenil   No evidence of plaquenil toxicity in either eye. Will follow in 1 year for a plaquenil eye exam based on current guidelines. Patient is approved to continue on plaquenil as directed by their PCP or rheumatologist.      Age-related nuclear cataract of both eyes  Discussed mild cataracts in both eyes that are not affecting vision and are not surgical at this time. New glasses improve vision; suggest update. No orders of the defined types were placed in this encounter.       Meds This Visit:  Requested Prescriptions      No prescriptions requested or ordered in this encounter        Follow up instructions:  Return in about 1 year (around 10/11/2024) for Plaquenil eye exam.    10/11/2023  Scribed by: Brook Palma MD        If you have questions, please do not hesitate to call me. I look forward to following Thom Joya along with you.     Sincerely,        Brook Palma MD        CC:   No Recipients    Document electronically generated by: Brook Palma MD